# Patient Record
Sex: FEMALE | Race: ASIAN | NOT HISPANIC OR LATINO
[De-identification: names, ages, dates, MRNs, and addresses within clinical notes are randomized per-mention and may not be internally consistent; named-entity substitution may affect disease eponyms.]

---

## 2018-11-04 PROBLEM — Z00.00 ENCOUNTER FOR PREVENTIVE HEALTH EXAMINATION: Status: ACTIVE | Noted: 2018-11-04

## 2018-12-10 ENCOUNTER — APPOINTMENT (OUTPATIENT)
Dept: OBGYN | Facility: CLINIC | Age: 33
End: 2018-12-10
Payer: COMMERCIAL

## 2018-12-10 VITALS
HEIGHT: 63 IN | SYSTOLIC BLOOD PRESSURE: 120 MMHG | BODY MASS INDEX: 26.58 KG/M2 | DIASTOLIC BLOOD PRESSURE: 80 MMHG | WEIGHT: 150 LBS

## 2018-12-10 DIAGNOSIS — Z83.3 FAMILY HISTORY OF DIABETES MELLITUS: ICD-10-CM

## 2018-12-10 DIAGNOSIS — Z78.9 OTHER SPECIFIED HEALTH STATUS: ICD-10-CM

## 2018-12-10 DIAGNOSIS — Z82.49 FAMILY HISTORY OF ISCHEMIC HEART DISEASE AND OTHER DISEASES OF THE CIRCULATORY SYSTEM: ICD-10-CM

## 2018-12-10 PROCEDURE — 0500F INITIAL PRENATAL CARE VISIT: CPT

## 2018-12-10 PROCEDURE — 76805 OB US >/= 14 WKS SNGL FETUS: CPT

## 2018-12-10 RX ORDER — PRENATAL VIT 49/IRON FUM/FOLIC 6.75-0.2MG
TABLET ORAL
Refills: 0 | Status: ACTIVE | COMMUNITY

## 2018-12-12 LAB
ABO + RH PNL BLD: NORMAL
BLD GP AB SCN SERPL QL: NORMAL
C TRACH RRNA SPEC QL NAA+PROBE: NOT DETECTED
CMV IGG SERPL QL: 6.7 U/ML
CMV IGG SERPL-IMP: POSITIVE
CMV IGM SERPL QL: <8 AU/ML
CMV IGM SERPL QL: NEGATIVE
HAV IGM SER QL: NONREACTIVE
HCV AB SER QL: NONREACTIVE
HCV S/CO RATIO: 0.24 S/CO
HEPATITIS A IGG ANTIBODY: NONREACTIVE
HSV 1+2 IGG SER IA-IMP: NEGATIVE
HSV 1+2 IGG SER IA-IMP: POSITIVE
HSV1 IGG SER QL: 51.6 INDEX
HSV2 IGG SER QL: 0.08 INDEX
MEV IGG FLD QL IA: 26.8 AU/ML
MEV IGG+IGM SER-IMP: NORMAL
N GONORRHOEA RRNA SPEC QL NAA+PROBE: NOT DETECTED
RUBV IGG FLD-ACNC: 3.6 INDEX
RUBV IGG SER-IMP: POSITIVE
SOURCE AMPLIFICATION: NORMAL
T GONDII AB SER-IMP: NEGATIVE
T GONDII AB SER-IMP: NEGATIVE
T GONDII IGG SER QL: <3 IU/ML
T GONDII IGM SER QL: <3 AU/ML
TSH SERPL-ACNC: 1.46 UIU/ML
VZV AB TITR SER: POSITIVE
VZV IGG SER IF-ACNC: 415.2 INDEX

## 2018-12-13 LAB
B19V IGG SER QL IA: 1.2 INDEX
B19V IGG+IGM SER-IMP: NORMAL
B19V IGG+IGM SER-IMP: POSITIVE
B19V IGM FLD-ACNC: 0.1 INDEX
B19V IGM SER-ACNC: NEGATIVE
MEV IGM SER QL: NEGATIVE
RUBV IGM FLD-ACNC: <20 AU/ML

## 2018-12-14 LAB
HSV1 IGM SER QL: NORMAL TITER
HSV2 AB FLD-ACNC: NORMAL TITER
VZV IGM SER IF-ACNC: <0.91 INDEX

## 2018-12-24 ENCOUNTER — APPOINTMENT (OUTPATIENT)
Dept: OBGYN | Facility: CLINIC | Age: 33
End: 2018-12-24
Payer: COMMERCIAL

## 2018-12-24 ENCOUNTER — NON-APPOINTMENT (OUTPATIENT)
Age: 33
End: 2018-12-24

## 2018-12-24 ENCOUNTER — CHART COPY (OUTPATIENT)
Age: 33
End: 2018-12-24

## 2018-12-24 VITALS
SYSTOLIC BLOOD PRESSURE: 110 MMHG | WEIGHT: 149 LBS | BODY MASS INDEX: 26.4 KG/M2 | HEIGHT: 63 IN | DIASTOLIC BLOOD PRESSURE: 80 MMHG

## 2018-12-24 DIAGNOSIS — O28.9 UNSPECIFIED ABNORMAL FINDINGS ON ANTENATAL SCREENING OF MOTHER: ICD-10-CM

## 2018-12-24 PROCEDURE — 0502F SUBSEQUENT PRENATAL CARE: CPT

## 2019-01-04 LAB
CLARIM 15Q11.2: NORMAL
CLARIM 1P36: NORMAL
CLARIM 22Q11.2: NORMAL
CLARIM 4P-/WOLF-HIRSCHHORN: NORMAL
CLARIM 5P-/CRI DU CHAT: NORMAL
CLARIM ADDITIONAL INFO: NORMAL
CLARIM CHROMOSOME 13: NORMAL
CLARIM CHROMOSOME 18: NORMAL
CLARIM CHROMOSOME 21: NORMAL
CLARIM SEX CHROMOSOMES: NORMAL
CLARITEST NIPT W/MICRO: NORMAL

## 2019-01-17 ENCOUNTER — NON-APPOINTMENT (OUTPATIENT)
Age: 34
End: 2019-01-17

## 2019-01-17 ENCOUNTER — APPOINTMENT (OUTPATIENT)
Dept: OBGYN | Facility: CLINIC | Age: 34
End: 2019-01-17
Payer: COMMERCIAL

## 2019-01-17 VITALS
BODY MASS INDEX: 26.75 KG/M2 | DIASTOLIC BLOOD PRESSURE: 60 MMHG | HEIGHT: 63 IN | WEIGHT: 151 LBS | SYSTOLIC BLOOD PRESSURE: 100 MMHG

## 2019-01-17 PROCEDURE — 0502F SUBSEQUENT PRENATAL CARE: CPT

## 2019-02-19 ENCOUNTER — LABORATORY RESULT (OUTPATIENT)
Age: 34
End: 2019-02-19

## 2019-02-19 ENCOUNTER — APPOINTMENT (OUTPATIENT)
Dept: OBGYN | Facility: CLINIC | Age: 34
End: 2019-02-19
Payer: COMMERCIAL

## 2019-02-19 VITALS — DIASTOLIC BLOOD PRESSURE: 75 MMHG | HEIGHT: 63 IN | SYSTOLIC BLOOD PRESSURE: 118 MMHG

## 2019-02-19 PROCEDURE — 0502F SUBSEQUENT PRENATAL CARE: CPT

## 2019-02-21 LAB — BACTERIA UR CULT: NORMAL

## 2019-03-15 ENCOUNTER — APPOINTMENT (OUTPATIENT)
Dept: OBGYN | Facility: CLINIC | Age: 34
End: 2019-03-15
Payer: COMMERCIAL

## 2019-03-15 ENCOUNTER — LABORATORY RESULT (OUTPATIENT)
Age: 34
End: 2019-03-15

## 2019-03-15 VITALS
DIASTOLIC BLOOD PRESSURE: 80 MMHG | WEIGHT: 166 LBS | SYSTOLIC BLOOD PRESSURE: 110 MMHG | BODY MASS INDEX: 29.41 KG/M2 | HEIGHT: 63 IN

## 2019-03-15 PROCEDURE — 0502F SUBSEQUENT PRENATAL CARE: CPT

## 2019-03-17 LAB
BACTERIA UR CULT: NORMAL
BASOPHILS # BLD AUTO: 0 K/UL
BASOPHILS NFR BLD AUTO: 0 %
EOSINOPHIL # BLD AUTO: 0 K/UL
EOSINOPHIL NFR BLD AUTO: 0 %
GLUCOSE 1H P 50 G GLC PO SERPL-MCNC: 79 MG/DL
HBA1C MFR BLD HPLC: 5 %
HCT VFR BLD CALC: 40.3 %
HGB BLD-MCNC: 12.6 G/DL
LYMPHOCYTES # BLD AUTO: 2.15 K/UL
LYMPHOCYTES NFR BLD AUTO: 19.5 %
MAN DIFF?: NORMAL
MCHC RBC-ENTMCNC: 31.3 GM/DL
MCHC RBC-ENTMCNC: 33.2 PG
MCV RBC AUTO: 106.1 FL
MONOCYTES # BLD AUTO: 0.39 K/UL
MONOCYTES NFR BLD AUTO: 3.5 %
NEUTROPHILS # BLD AUTO: 8.39 K/UL
NEUTROPHILS NFR BLD AUTO: 76.1 %
PLATELET # BLD AUTO: 195 K/UL
RBC # BLD: 3.8 M/UL
RBC # FLD: 12.7 %
WBC # FLD AUTO: 11.03 K/UL

## 2019-04-03 ENCOUNTER — APPOINTMENT (OUTPATIENT)
Dept: OBGYN | Facility: CLINIC | Age: 34
End: 2019-04-03
Payer: COMMERCIAL

## 2019-04-03 VITALS
BODY MASS INDEX: 30.12 KG/M2 | DIASTOLIC BLOOD PRESSURE: 70 MMHG | WEIGHT: 170 LBS | HEIGHT: 63 IN | SYSTOLIC BLOOD PRESSURE: 110 MMHG

## 2019-04-03 PROCEDURE — 0502F SUBSEQUENT PRENATAL CARE: CPT

## 2019-04-04 LAB — HIV1+2 AB SPEC QL IA.RAPID: NONREACTIVE

## 2019-04-29 ENCOUNTER — APPOINTMENT (OUTPATIENT)
Dept: OBGYN | Facility: CLINIC | Age: 34
End: 2019-04-29
Payer: COMMERCIAL

## 2019-04-29 VITALS
WEIGHT: 177 LBS | SYSTOLIC BLOOD PRESSURE: 120 MMHG | HEIGHT: 63 IN | BODY MASS INDEX: 31.36 KG/M2 | DIASTOLIC BLOOD PRESSURE: 75 MMHG

## 2019-04-29 DIAGNOSIS — Z34.92 ENCOUNTER FOR SUPERVISION OF NORMAL PREGNANCY, UNSPECIFIED, SECOND TRIMESTER: ICD-10-CM

## 2019-04-29 PROCEDURE — 0502F SUBSEQUENT PRENATAL CARE: CPT

## 2019-05-14 ENCOUNTER — APPOINTMENT (OUTPATIENT)
Dept: ORTHOPEDIC SURGERY | Facility: CLINIC | Age: 34
End: 2019-05-14
Payer: COMMERCIAL

## 2019-05-14 ENCOUNTER — APPOINTMENT (OUTPATIENT)
Dept: OBGYN | Facility: CLINIC | Age: 34
End: 2019-05-14
Payer: COMMERCIAL

## 2019-05-14 ENCOUNTER — APPOINTMENT (OUTPATIENT)
Dept: ANTEPARTUM | Facility: CLINIC | Age: 34
End: 2019-05-14
Payer: COMMERCIAL

## 2019-05-14 VITALS
BODY MASS INDEX: 32.07 KG/M2 | WEIGHT: 181 LBS | SYSTOLIC BLOOD PRESSURE: 120 MMHG | DIASTOLIC BLOOD PRESSURE: 80 MMHG | HEIGHT: 63 IN

## 2019-05-14 VITALS — WEIGHT: 181 LBS | RESPIRATION RATE: 16 BRPM | HEIGHT: 63 IN | BODY MASS INDEX: 32.07 KG/M2

## 2019-05-14 PROCEDURE — 20526 THER INJECTION CARP TUNNEL: CPT | Mod: LT

## 2019-05-14 PROCEDURE — 76820 UMBILICAL ARTERY ECHO: CPT

## 2019-05-14 PROCEDURE — 76816 OB US FOLLOW-UP PER FETUS: CPT

## 2019-05-14 PROCEDURE — 99203 OFFICE O/P NEW LOW 30 MIN: CPT

## 2019-05-14 PROCEDURE — 0502F SUBSEQUENT PRENATAL CARE: CPT

## 2019-05-14 PROCEDURE — 76819 FETAL BIOPHYS PROFIL W/O NST: CPT

## 2019-05-21 ENCOUNTER — MOBILE ON CALL (OUTPATIENT)
Age: 34
End: 2019-05-21

## 2019-05-21 ENCOUNTER — OTHER (OUTPATIENT)
Age: 34
End: 2019-05-21

## 2019-06-04 ENCOUNTER — APPOINTMENT (OUTPATIENT)
Dept: OBGYN | Facility: CLINIC | Age: 34
End: 2019-06-04
Payer: COMMERCIAL

## 2019-06-04 ENCOUNTER — APPOINTMENT (OUTPATIENT)
Dept: ANTEPARTUM | Facility: CLINIC | Age: 34
End: 2019-06-04
Payer: COMMERCIAL

## 2019-06-04 ENCOUNTER — NON-APPOINTMENT (OUTPATIENT)
Age: 34
End: 2019-06-04

## 2019-06-04 VITALS
DIASTOLIC BLOOD PRESSURE: 80 MMHG | BODY MASS INDEX: 32.78 KG/M2 | SYSTOLIC BLOOD PRESSURE: 120 MMHG | HEIGHT: 63 IN | WEIGHT: 185 LBS

## 2019-06-04 PROCEDURE — 87653 STREP B DNA AMP PROBE: CPT

## 2019-06-04 PROCEDURE — 0502F SUBSEQUENT PRENATAL CARE: CPT

## 2019-06-04 PROCEDURE — 76816 OB US FOLLOW-UP PER FETUS: CPT

## 2019-06-04 PROCEDURE — 76819 FETAL BIOPHYS PROFIL W/O NST: CPT

## 2019-06-11 ENCOUNTER — APPOINTMENT (OUTPATIENT)
Dept: ANTEPARTUM | Facility: CLINIC | Age: 34
End: 2019-06-11
Payer: COMMERCIAL

## 2019-06-11 PROCEDURE — 76819 FETAL BIOPHYS PROFIL W/O NST: CPT

## 2019-06-11 PROCEDURE — 76820 UMBILICAL ARTERY ECHO: CPT

## 2019-06-11 PROCEDURE — 76816 OB US FOLLOW-UP PER FETUS: CPT

## 2019-06-12 ENCOUNTER — APPOINTMENT (OUTPATIENT)
Dept: OBGYN | Facility: CLINIC | Age: 34
End: 2019-06-12
Payer: COMMERCIAL

## 2019-06-12 VITALS
BODY MASS INDEX: 33.13 KG/M2 | DIASTOLIC BLOOD PRESSURE: 80 MMHG | WEIGHT: 187 LBS | HEIGHT: 63 IN | SYSTOLIC BLOOD PRESSURE: 118 MMHG

## 2019-06-12 DIAGNOSIS — Z34.93 ENCOUNTER FOR SUPERVISION OF NORMAL PREGNANCY, UNSPECIFIED, THIRD TRIMESTER: ICD-10-CM

## 2019-06-12 PROCEDURE — 0502F SUBSEQUENT PRENATAL CARE: CPT

## 2019-06-14 ENCOUNTER — INPATIENT (INPATIENT)
Facility: HOSPITAL | Age: 34
LOS: 4 days | Discharge: HOME | End: 2019-06-19
Attending: OBSTETRICS & GYNECOLOGY | Admitting: OBSTETRICS & GYNECOLOGY
Payer: COMMERCIAL

## 2019-06-14 VITALS — WEIGHT: 185.41 LBS

## 2019-06-14 DIAGNOSIS — O26.899 OTHER SPECIFIED PREGNANCY RELATED CONDITIONS, UNSPECIFIED TRIMESTER: ICD-10-CM

## 2019-06-14 DIAGNOSIS — Z3A.00 WEEKS OF GESTATION OF PREGNANCY NOT SPECIFIED: ICD-10-CM

## 2019-06-14 LAB
ALBUMIN SERPL ELPH-MCNC: 3.7 G/DL — SIGNIFICANT CHANGE UP (ref 3.3–5)
ALP SERPL-CCNC: 158 U/L — HIGH (ref 40–120)
ALT FLD-CCNC: 26 U/L — SIGNIFICANT CHANGE UP (ref 10–45)
ANION GAP SERPL CALC-SCNC: 12 MMOL/L — SIGNIFICANT CHANGE UP (ref 5–17)
AST SERPL-CCNC: 38 U/L — SIGNIFICANT CHANGE UP (ref 10–40)
BASOPHILS # BLD AUTO: 0.04 K/UL — SIGNIFICANT CHANGE UP (ref 0–0.2)
BASOPHILS NFR BLD AUTO: 0.4 % — SIGNIFICANT CHANGE UP (ref 0–2)
BILIRUB SERPL-MCNC: 0.2 MG/DL — SIGNIFICANT CHANGE UP (ref 0.2–1.2)
BUN SERPL-MCNC: 10 MG/DL — SIGNIFICANT CHANGE UP (ref 7–23)
CALCIUM SERPL-MCNC: 9.8 MG/DL — SIGNIFICANT CHANGE UP (ref 8.4–10.5)
CHLORIDE SERPL-SCNC: 105 MMOL/L — SIGNIFICANT CHANGE UP (ref 96–108)
CO2 SERPL-SCNC: 23 MMOL/L — SIGNIFICANT CHANGE UP (ref 22–31)
CREAT SERPL-MCNC: 0.87 MG/DL — SIGNIFICANT CHANGE UP (ref 0.5–1.3)
EOSINOPHIL # BLD AUTO: 0.1 K/UL — SIGNIFICANT CHANGE UP (ref 0–0.5)
EOSINOPHIL NFR BLD AUTO: 0.9 % — SIGNIFICANT CHANGE UP (ref 0–6)
GLUCOSE SERPL-MCNC: 96 MG/DL — SIGNIFICANT CHANGE UP (ref 70–99)
HCT VFR BLD CALC: 41.9 % — SIGNIFICANT CHANGE UP (ref 34.5–45)
HGB BLD-MCNC: 14.7 G/DL — SIGNIFICANT CHANGE UP (ref 11.5–15.5)
IMM GRANULOCYTES NFR BLD AUTO: 0.7 % — SIGNIFICANT CHANGE UP (ref 0–1.5)
LDH SERPL L TO P-CCNC: 343 U/L — HIGH (ref 50–242)
LYMPHOCYTES # BLD AUTO: 2.67 K/UL — SIGNIFICANT CHANGE UP (ref 1–3.3)
LYMPHOCYTES # BLD AUTO: 24.5 % — SIGNIFICANT CHANGE UP (ref 13–44)
MCHC RBC-ENTMCNC: 34 PG — SIGNIFICANT CHANGE UP (ref 27–34)
MCHC RBC-ENTMCNC: 35.1 GM/DL — SIGNIFICANT CHANGE UP (ref 32–36)
MCV RBC AUTO: 97 FL — SIGNIFICANT CHANGE UP (ref 80–100)
MONOCYTES # BLD AUTO: 0.93 K/UL — HIGH (ref 0–0.9)
MONOCYTES NFR BLD AUTO: 8.5 % — SIGNIFICANT CHANGE UP (ref 2–14)
NEUTROPHILS # BLD AUTO: 7.07 K/UL — SIGNIFICANT CHANGE UP (ref 1.8–7.4)
NEUTROPHILS NFR BLD AUTO: 65 % — SIGNIFICANT CHANGE UP (ref 43–77)
NRBC # BLD: 0 /100 WBCS — SIGNIFICANT CHANGE UP (ref 0–0)
PLATELET # BLD AUTO: 177 K/UL — SIGNIFICANT CHANGE UP (ref 150–400)
POTASSIUM SERPL-MCNC: 4.8 MMOL/L — SIGNIFICANT CHANGE UP (ref 3.5–5.3)
POTASSIUM SERPL-SCNC: 4.8 MMOL/L — SIGNIFICANT CHANGE UP (ref 3.5–5.3)
PROT SERPL-MCNC: 7.1 G/DL — SIGNIFICANT CHANGE UP (ref 6–8.3)
RBC # BLD: 4.32 M/UL — SIGNIFICANT CHANGE UP (ref 3.8–5.2)
RBC # FLD: 12 % — SIGNIFICANT CHANGE UP (ref 10.3–14.5)
SODIUM SERPL-SCNC: 140 MMOL/L — SIGNIFICANT CHANGE UP (ref 135–145)
URATE SERPL-MCNC: 6.2 MG/DL — SIGNIFICANT CHANGE UP (ref 2.5–7)
WBC # BLD: 10.89 K/UL — HIGH (ref 3.8–10.5)
WBC # FLD AUTO: 10.89 K/UL — HIGH (ref 3.8–10.5)

## 2019-06-14 RX ORDER — PENICILLIN G POTASSIUM 5000000 [IU]/1
5 POWDER, FOR SOLUTION INTRAMUSCULAR; INTRAPLEURAL; INTRATHECAL; INTRAVENOUS ONCE
Refills: 0 | Status: COMPLETED | OUTPATIENT
Start: 2019-06-14 | End: 2019-06-14

## 2019-06-14 RX ORDER — MAGNESIUM SULFATE 500 MG/ML
4 VIAL (ML) INJECTION ONCE
Refills: 0 | Status: COMPLETED | OUTPATIENT
Start: 2019-06-14 | End: 2019-06-14

## 2019-06-14 RX ORDER — SODIUM CHLORIDE 9 MG/ML
1000 INJECTION, SOLUTION INTRAVENOUS
Refills: 0 | Status: DISCONTINUED | OUTPATIENT
Start: 2019-06-14 | End: 2019-06-14

## 2019-06-14 RX ORDER — DINOPROSTONE 10 MG/241MG
10 INSERT VAGINAL ONCE
Refills: 0 | Status: COMPLETED | OUTPATIENT
Start: 2019-06-14 | End: 2019-06-15

## 2019-06-14 RX ORDER — MAGNESIUM SULFATE 500 MG/ML
2 VIAL (ML) INJECTION
Qty: 40 | Refills: 0 | Status: DISCONTINUED | OUTPATIENT
Start: 2019-06-14 | End: 2019-06-16

## 2019-06-14 RX ORDER — MAGNESIUM SULFATE 500 MG/ML
2 VIAL (ML) INJECTION ONCE
Qty: 40 | Refills: 0 | Status: COMPLETED | OUTPATIENT
Start: 2019-06-14 | End: 2019-06-14

## 2019-06-14 RX ORDER — PENICILLIN G POTASSIUM 5000000 [IU]/1
POWDER, FOR SOLUTION INTRAMUSCULAR; INTRAPLEURAL; INTRATHECAL; INTRAVENOUS
Refills: 0 | Status: DISCONTINUED | OUTPATIENT
Start: 2019-06-14 | End: 2019-06-16

## 2019-06-14 RX ORDER — OXYTOCIN 10 UNIT/ML
333.33 VIAL (ML) INJECTION
Qty: 20 | Refills: 0 | Status: DISCONTINUED | OUTPATIENT
Start: 2019-06-14 | End: 2019-06-19

## 2019-06-14 RX ORDER — PENICILLIN G POTASSIUM 5000000 [IU]/1
2.5 POWDER, FOR SOLUTION INTRAMUSCULAR; INTRAPLEURAL; INTRATHECAL; INTRAVENOUS EVERY 4 HOURS
Refills: 0 | Status: DISCONTINUED | OUTPATIENT
Start: 2019-06-15 | End: 2019-06-16

## 2019-06-14 RX ORDER — SODIUM CHLORIDE 9 MG/ML
1000 INJECTION, SOLUTION INTRAVENOUS
Refills: 0 | Status: DISCONTINUED | OUTPATIENT
Start: 2019-06-14 | End: 2019-06-16

## 2019-06-14 RX ORDER — CITRIC ACID/SODIUM CITRATE 300-500 MG
15 SOLUTION, ORAL ORAL ONCE
Refills: 0 | Status: COMPLETED | OUTPATIENT
Start: 2019-06-14 | End: 2019-06-16

## 2019-06-14 RX ADMIN — PENICILLIN G POTASSIUM 100 MILLION UNIT(S): 5000000 POWDER, FOR SOLUTION INTRAMUSCULAR; INTRAPLEURAL; INTRATHECAL; INTRAVENOUS at 23:26

## 2019-06-14 RX ADMIN — Medication 100 GRAM(S): at 22:55

## 2019-06-14 RX ADMIN — Medication 50 GM/HR: at 23:43

## 2019-06-14 RX ADMIN — SODIUM CHLORIDE 75 MILLILITER(S): 9 INJECTION, SOLUTION INTRAVENOUS at 23:44

## 2019-06-15 LAB
BLD GP AB SCN SERPL QL: NEGATIVE — SIGNIFICANT CHANGE UP
CREAT ?TM UR-MCNC: 49 MG/DL — SIGNIFICANT CHANGE UP
MAGNESIUM SERPL-MCNC: 5 MG/DL — HIGH (ref 1.6–2.6)
MAGNESIUM SERPL-MCNC: 5.6 MG/DL — HIGH (ref 1.6–2.6)
MAGNESIUM SERPL-MCNC: 5.8 MG/DL — HIGH (ref 1.6–2.6)
PROT ?TM UR-MCNC: 10 MG/DL — SIGNIFICANT CHANGE UP (ref 0–12)
PROT/CREAT UR-RTO: 0.2 RATIO — SIGNIFICANT CHANGE UP (ref 0–0.2)
RH IG SCN BLD-IMP: POSITIVE — SIGNIFICANT CHANGE UP
RH IG SCN BLD-IMP: POSITIVE — SIGNIFICANT CHANGE UP

## 2019-06-15 RX ORDER — OXYTOCIN 10 UNIT/ML
1 VIAL (ML) INJECTION
Qty: 30 | Refills: 0 | Status: DISCONTINUED | OUTPATIENT
Start: 2019-06-15 | End: 2019-06-16

## 2019-06-15 RX ADMIN — PENICILLIN G POTASSIUM 100 MILLION UNIT(S): 5000000 POWDER, FOR SOLUTION INTRAMUSCULAR; INTRAPLEURAL; INTRATHECAL; INTRAVENOUS at 11:30

## 2019-06-15 RX ADMIN — PENICILLIN G POTASSIUM 100 MILLION UNIT(S): 5000000 POWDER, FOR SOLUTION INTRAMUSCULAR; INTRAPLEURAL; INTRATHECAL; INTRAVENOUS at 07:04

## 2019-06-15 RX ADMIN — PENICILLIN G POTASSIUM 100 MILLION UNIT(S): 5000000 POWDER, FOR SOLUTION INTRAMUSCULAR; INTRAPLEURAL; INTRATHECAL; INTRAVENOUS at 23:26

## 2019-06-15 RX ADMIN — DINOPROSTONE 10 MILLIGRAM(S): 10 INSERT VAGINAL at 00:12

## 2019-06-15 RX ADMIN — PENICILLIN G POTASSIUM 100 MILLION UNIT(S): 5000000 POWDER, FOR SOLUTION INTRAMUSCULAR; INTRAPLEURAL; INTRATHECAL; INTRAVENOUS at 15:17

## 2019-06-15 RX ADMIN — PENICILLIN G POTASSIUM 100 MILLION UNIT(S): 5000000 POWDER, FOR SOLUTION INTRAMUSCULAR; INTRAPLEURAL; INTRATHECAL; INTRAVENOUS at 19:30

## 2019-06-15 RX ADMIN — PENICILLIN G POTASSIUM 100 MILLION UNIT(S): 5000000 POWDER, FOR SOLUTION INTRAMUSCULAR; INTRAPLEURAL; INTRATHECAL; INTRAVENOUS at 03:39

## 2019-06-15 RX ADMIN — Medication 1 MILLIUNIT(S)/MIN: at 22:15

## 2019-06-16 ENCOUNTER — RESULT REVIEW (OUTPATIENT)
Age: 34
End: 2019-06-16

## 2019-06-16 LAB
ALBUMIN SERPL ELPH-MCNC: 3.9 G/DL — SIGNIFICANT CHANGE UP (ref 3.3–5)
ALP SERPL-CCNC: 183 U/L — HIGH (ref 40–120)
ALT FLD-CCNC: 28 U/L — SIGNIFICANT CHANGE UP (ref 10–45)
ANION GAP SERPL CALC-SCNC: 17 MMOL/L — SIGNIFICANT CHANGE UP (ref 5–17)
APTT BLD: 28.7 SEC — SIGNIFICANT CHANGE UP (ref 27.5–36.3)
AST SERPL-CCNC: 29 U/L — SIGNIFICANT CHANGE UP (ref 10–40)
BASOPHILS # BLD AUTO: 0.04 K/UL — SIGNIFICANT CHANGE UP (ref 0–0.2)
BASOPHILS NFR BLD AUTO: 0.3 % — SIGNIFICANT CHANGE UP (ref 0–2)
BILIRUB SERPL-MCNC: 0.4 MG/DL — SIGNIFICANT CHANGE UP (ref 0.2–1.2)
BUN SERPL-MCNC: 7 MG/DL — SIGNIFICANT CHANGE UP (ref 7–23)
CALCIUM SERPL-MCNC: 8.1 MG/DL — LOW (ref 8.4–10.5)
CHLORIDE SERPL-SCNC: 97 MMOL/L — SIGNIFICANT CHANGE UP (ref 96–108)
CO2 SERPL-SCNC: 20 MMOL/L — LOW (ref 22–31)
CREAT SERPL-MCNC: 0.83 MG/DL — SIGNIFICANT CHANGE UP (ref 0.5–1.3)
EOSINOPHIL # BLD AUTO: 0.06 K/UL — SIGNIFICANT CHANGE UP (ref 0–0.5)
EOSINOPHIL NFR BLD AUTO: 0.4 % — SIGNIFICANT CHANGE UP (ref 0–6)
FIBRINOGEN PPP-MCNC: 408 MG/DL — SIGNIFICANT CHANGE UP (ref 258–438)
GLUCOSE SERPL-MCNC: 115 MG/DL — HIGH (ref 70–99)
HCT VFR BLD CALC: 44.6 % — SIGNIFICANT CHANGE UP (ref 34.5–45)
HGB BLD-MCNC: 15.8 G/DL — HIGH (ref 11.5–15.5)
IMM GRANULOCYTES NFR BLD AUTO: 0.5 % — SIGNIFICANT CHANGE UP (ref 0–1.5)
INR BLD: 0.89 — SIGNIFICANT CHANGE UP (ref 0.88–1.16)
LDH SERPL L TO P-CCNC: 266 U/L — HIGH (ref 50–242)
LYMPHOCYTES # BLD AUTO: 17.1 % — SIGNIFICANT CHANGE UP (ref 13–44)
LYMPHOCYTES # BLD AUTO: 2.54 K/UL — SIGNIFICANT CHANGE UP (ref 1–3.3)
MAGNESIUM SERPL-MCNC: 5.4 MG/DL — HIGH (ref 1.6–2.6)
MAGNESIUM SERPL-MCNC: 5.4 MG/DL — HIGH (ref 1.6–2.6)
MAGNESIUM SERPL-MCNC: 6.1 MG/DL — HIGH (ref 1.6–2.6)
MAGNESIUM SERPL-MCNC: 6.3 MG/DL — HIGH (ref 1.6–2.6)
MCHC RBC-ENTMCNC: 33.7 PG — SIGNIFICANT CHANGE UP (ref 27–34)
MCHC RBC-ENTMCNC: 35.4 GM/DL — SIGNIFICANT CHANGE UP (ref 32–36)
MCV RBC AUTO: 95.1 FL — SIGNIFICANT CHANGE UP (ref 80–100)
MONOCYTES # BLD AUTO: 0.97 K/UL — HIGH (ref 0–0.9)
MONOCYTES NFR BLD AUTO: 6.5 % — SIGNIFICANT CHANGE UP (ref 2–14)
NEUTROPHILS # BLD AUTO: 11.19 K/UL — HIGH (ref 1.8–7.4)
NEUTROPHILS NFR BLD AUTO: 75.2 % — SIGNIFICANT CHANGE UP (ref 43–77)
NRBC # BLD: 0 /100 WBCS — SIGNIFICANT CHANGE UP (ref 0–0)
PLATELET # BLD AUTO: 212 K/UL — SIGNIFICANT CHANGE UP (ref 150–400)
POTASSIUM SERPL-MCNC: 4 MMOL/L — SIGNIFICANT CHANGE UP (ref 3.5–5.3)
POTASSIUM SERPL-SCNC: 4 MMOL/L — SIGNIFICANT CHANGE UP (ref 3.5–5.3)
PROT SERPL-MCNC: 7.5 G/DL — SIGNIFICANT CHANGE UP (ref 6–8.3)
PROTHROM AB SERPL-ACNC: 10 SEC — SIGNIFICANT CHANGE UP (ref 10–12.9)
RBC # BLD: 4.69 M/UL — SIGNIFICANT CHANGE UP (ref 3.8–5.2)
RBC # FLD: 12.1 % — SIGNIFICANT CHANGE UP (ref 10.3–14.5)
SODIUM SERPL-SCNC: 134 MMOL/L — LOW (ref 135–145)
T PALLIDUM AB TITR SER: NEGATIVE — SIGNIFICANT CHANGE UP
URATE SERPL-MCNC: 6.2 MG/DL — SIGNIFICANT CHANGE UP (ref 2.5–7)
WBC # BLD: 14.87 K/UL — HIGH (ref 3.8–10.5)
WBC # FLD AUTO: 14.87 K/UL — HIGH (ref 3.8–10.5)

## 2019-06-16 PROCEDURE — 59510 CESAREAN DELIVERY: CPT

## 2019-06-16 RX ORDER — OXYCODONE HYDROCHLORIDE 5 MG/1
5 TABLET ORAL
Refills: 0 | Status: COMPLETED | OUTPATIENT
Start: 2019-06-16 | End: 2019-06-23

## 2019-06-16 RX ORDER — FENTANYL/BUPIVACAINE/NS/PF 2MCG/ML-.1
250 PLASTIC BAG, INJECTION (ML) INJECTION
Refills: 0 | Status: DISCONTINUED | OUTPATIENT
Start: 2019-06-16 | End: 2019-06-16

## 2019-06-16 RX ORDER — MAGNESIUM SULFATE 500 MG/ML
2 VIAL (ML) INJECTION
Qty: 40 | Refills: 0 | Status: DISCONTINUED | OUTPATIENT
Start: 2019-06-16 | End: 2019-06-17

## 2019-06-16 RX ORDER — OXYCODONE HYDROCHLORIDE 5 MG/1
5 TABLET ORAL ONCE
Refills: 0 | Status: DISCONTINUED | OUTPATIENT
Start: 2019-06-16 | End: 2019-06-19

## 2019-06-16 RX ORDER — CITRIC ACID/SODIUM CITRATE 300-500 MG
30 SOLUTION, ORAL ORAL ONCE
Refills: 0 | Status: COMPLETED | OUTPATIENT
Start: 2019-06-16 | End: 2019-06-16

## 2019-06-16 RX ORDER — GLYCERIN ADULT
1 SUPPOSITORY, RECTAL RECTAL AT BEDTIME
Refills: 0 | Status: DISCONTINUED | OUTPATIENT
Start: 2019-06-16 | End: 2019-06-19

## 2019-06-16 RX ORDER — DOCUSATE SODIUM 100 MG
100 CAPSULE ORAL
Refills: 0 | Status: DISCONTINUED | OUTPATIENT
Start: 2019-06-16 | End: 2019-06-19

## 2019-06-16 RX ORDER — LANOLIN
1 OINTMENT (GRAM) TOPICAL EVERY 6 HOURS
Refills: 0 | Status: DISCONTINUED | OUTPATIENT
Start: 2019-06-16 | End: 2019-06-19

## 2019-06-16 RX ORDER — SODIUM CHLORIDE 9 MG/ML
1000 INJECTION, SOLUTION INTRAVENOUS
Refills: 0 | Status: DISCONTINUED | OUTPATIENT
Start: 2019-06-16 | End: 2019-06-19

## 2019-06-16 RX ORDER — MAGNESIUM HYDROXIDE 400 MG/1
30 TABLET, CHEWABLE ORAL
Refills: 0 | Status: DISCONTINUED | OUTPATIENT
Start: 2019-06-16 | End: 2019-06-19

## 2019-06-16 RX ORDER — DIPHENHYDRAMINE HCL 50 MG
25 CAPSULE ORAL EVERY 6 HOURS
Refills: 0 | Status: DISCONTINUED | OUTPATIENT
Start: 2019-06-16 | End: 2019-06-19

## 2019-06-16 RX ORDER — CEFAZOLIN SODIUM 1 G
2000 VIAL (EA) INJECTION ONCE
Refills: 0 | Status: COMPLETED | OUTPATIENT
Start: 2019-06-16 | End: 2019-06-16

## 2019-06-16 RX ORDER — NALOXONE HYDROCHLORIDE 4 MG/.1ML
0.1 SPRAY NASAL
Refills: 0 | Status: DISCONTINUED | OUTPATIENT
Start: 2019-06-16 | End: 2019-06-16

## 2019-06-16 RX ORDER — DEXAMETHASONE 0.5 MG/5ML
4 ELIXIR ORAL EVERY 6 HOURS
Refills: 0 | Status: DISCONTINUED | OUTPATIENT
Start: 2019-06-16 | End: 2019-06-16

## 2019-06-16 RX ORDER — ACETAMINOPHEN 500 MG
975 TABLET ORAL EVERY 6 HOURS
Refills: 0 | Status: DISCONTINUED | OUTPATIENT
Start: 2019-06-16 | End: 2019-06-19

## 2019-06-16 RX ORDER — HEPARIN SODIUM 5000 [USP'U]/ML
5000 INJECTION INTRAVENOUS; SUBCUTANEOUS EVERY 12 HOURS
Refills: 0 | Status: DISCONTINUED | OUTPATIENT
Start: 2019-06-16 | End: 2019-06-19

## 2019-06-16 RX ORDER — AZITHROMYCIN 500 MG/1
500 TABLET, FILM COATED ORAL ONCE
Refills: 0 | Status: COMPLETED | OUTPATIENT
Start: 2019-06-16 | End: 2019-06-16

## 2019-06-16 RX ORDER — TETANUS TOXOID, REDUCED DIPHTHERIA TOXOID AND ACELLULAR PERTUSSIS VACCINE, ADSORBED 5; 2.5; 8; 8; 2.5 [IU]/.5ML; [IU]/.5ML; UG/.5ML; UG/.5ML; UG/.5ML
0.5 SUSPENSION INTRAMUSCULAR ONCE
Refills: 0 | Status: DISCONTINUED | OUTPATIENT
Start: 2019-06-16 | End: 2019-06-19

## 2019-06-16 RX ORDER — CHLORHEXIDINE GLUCONATE 213 G/1000ML
1 SOLUTION TOPICAL ONCE
Refills: 0 | Status: DISCONTINUED | OUTPATIENT
Start: 2019-06-16 | End: 2019-06-16

## 2019-06-16 RX ORDER — SIMETHICONE 80 MG/1
80 TABLET, CHEWABLE ORAL EVERY 4 HOURS
Refills: 0 | Status: DISCONTINUED | OUTPATIENT
Start: 2019-06-16 | End: 2019-06-19

## 2019-06-16 RX ORDER — OXYTOCIN 10 UNIT/ML
333.33 VIAL (ML) INJECTION
Qty: 20 | Refills: 0 | Status: DISCONTINUED | OUTPATIENT
Start: 2019-06-16 | End: 2019-06-19

## 2019-06-16 RX ORDER — ONDANSETRON 8 MG/1
4 TABLET, FILM COATED ORAL EVERY 6 HOURS
Refills: 0 | Status: DISCONTINUED | OUTPATIENT
Start: 2019-06-16 | End: 2019-06-16

## 2019-06-16 RX ADMIN — AZITHROMYCIN 255 MILLIGRAM(S): 500 TABLET, FILM COATED ORAL at 10:05

## 2019-06-16 RX ADMIN — PENICILLIN G POTASSIUM 100 MILLION UNIT(S): 5000000 POWDER, FOR SOLUTION INTRAMUSCULAR; INTRAPLEURAL; INTRATHECAL; INTRAVENOUS at 07:30

## 2019-06-16 RX ADMIN — Medication 975 MILLIGRAM(S): at 21:26

## 2019-06-16 RX ADMIN — Medication 975 MILLIGRAM(S): at 22:00

## 2019-06-16 RX ADMIN — Medication 100 MILLIGRAM(S): at 10:04

## 2019-06-16 RX ADMIN — Medication 250 MILLILITER(S): at 05:31

## 2019-06-16 RX ADMIN — Medication 30 MILLILITER(S): at 10:03

## 2019-06-16 RX ADMIN — Medication 15 MILLILITER(S): at 10:03

## 2019-06-16 RX ADMIN — Medication 50 GM/HR: at 11:52

## 2019-06-16 RX ADMIN — PENICILLIN G POTASSIUM 100 MILLION UNIT(S): 5000000 POWDER, FOR SOLUTION INTRAMUSCULAR; INTRAPLEURAL; INTRATHECAL; INTRAVENOUS at 03:15

## 2019-06-16 NOTE — PROGRESS NOTE ADULT - SUBJECTIVE AND OBJECTIVE BOX
Provider to bedside with nursing for evaluation of heavy lochia as informed by nursing. Upon evaluation, patient reports that she feels well. She reports that her pain is controlled. On abdominal exam, no bleeding noted with pressure applied to fundus. Abdomen nontender. Will continue to monitor for bleeding. Nurse agrees to call provider if bleeding worsens.

## 2019-06-16 NOTE — PROGRESS NOTE ADULT - ASSESSMENT
A&P: 33y Female   s/p  primary c/s for arrest of labor @ 6cm           complicated by preeclampsia with severe features.      1. Preeclampsia:   Continue IV Magnesium @2G/hr for 24 hrs post delivery.   Magnesium clinical checks and serum assay q6 hrs.  Next Mg check @ 00:00  No complaints currently.   BP controlled  2. GI: Diet: as tolerated   3. Neuro: PO pain medications PRN, hold NSAIDs  4. : strict Is and Os, davenport in place

## 2019-06-16 NOTE — PROGRESS NOTE ADULT - SUBJECTIVE AND OBJECTIVE BOX
Patient evaluated at bedside for clinical magnesium check.     She denies visual disturbances including scotoma, headache and right upper quadrant pain. Also denies nausea/vomiting/epigastric pain/shortness of breath. Pain well controlled.      T(C): 36.8 (06-16-19 @ 18:10), Max: 36.8 (06-16-19 @ 16:00)  HR: 73 (06-16-19 @ 18:10) (73 - 104)  BP: 118/77 (06-16-19 @ 18:10) (99/67 - 123/83)  RR: 18 (06-16-19 @ 18:10) (16 - 23)  SpO2: 96% (06-16-19 @ 18:10) (96% - 100%)  Wt(kg): --    Gen: NAD  Pulm: CTAB  Abd: soft, nontender, no rebound or guarding, no epigastric tenderness, liver nonpalpable +BS, fundus palpated, provenia dressing in place   : Ragland in place  Ext: Reflexes +                          15.8   14.87 )-----------( 212      ( 16 Jun 2019 04:35 )             44.6     06-16    134<L>  |  97  |  7   ----------------------------<  115<H>  4.0   |  20<L>  |  0.83    Ca    8.1<L>      16 Jun 2019 04:35  Mg     5.4     06-16    TPro  7.5  /  Alb  3.9  /  TBili  0.4  /  DBili  x   /  AST  29  /  ALT  28  /  AlkPhos  183<H>  06-16        06-15-19 @ 07:01  -  06-16-19 @ 07:00  --------------------------------------------------------  IN: 200 mL / OUT: 350 mL / NET: -150 mL    06-16-19 @ 07:01  -  06-16-19 @ 18:26  --------------------------------------------------------  IN: 7023 mL / OUT: 3325 mL / NET: 3698 mL      MEDICATIONS  (STANDING):  acetaminophen   Tablet .. 975 milliGRAM(s) Oral every 6 hours  diphtheria/tetanus/pertussis (acellular) Vaccine (ADAcel) 0.5 milliLiter(s) IntraMuscular once  heparin  Injectable 5000 Unit(s) SubCutaneous every 12 hours  lactated ringers. 1000 milliLiter(s) (125 mL/Hr) IV Continuous <Continuous>  magnesium sulfate Infusion 2 Gm/Hr (50 mL/Hr) IV Continuous <Continuous>  oxytocin Infusion 333.333 milliUNIT(s)/Min (1000 mL/Hr) IV Continuous <Continuous>  oxytocin Infusion 333.333 milliUNIT(s)/Min (1000 mL/Hr) IV Continuous <Continuous>    MEDICATIONS  (PRN):  diphenhydrAMINE 25 milliGRAM(s) Oral every 6 hours PRN Itching  docusate sodium 100 milliGRAM(s) Oral two times a day PRN Stool softening  glycerin Suppository - Adult 1 Suppository(s) Rectal at bedtime PRN Constipation  lanolin Ointment 1 Application(s) Topical every 6 hours PRN Sore Nipples  magnesium hydroxide Suspension 30 milliLiter(s) Oral two times a day PRN Constipation  oxyCODONE    IR 5 milliGRAM(s) Oral every 3 hours PRN Moderate Pain (4 - 6)  oxyCODONE    IR 5 milliGRAM(s) Oral once PRN Severe Pain (7 - 10)  simethicone 80 milliGRAM(s) Chew every 4 hours PRN Gas

## 2019-06-17 LAB
BASOPHILS # BLD AUTO: 0.03 K/UL — SIGNIFICANT CHANGE UP (ref 0–0.2)
BASOPHILS NFR BLD AUTO: 0.1 % — SIGNIFICANT CHANGE UP (ref 0–2)
EOSINOPHIL # BLD AUTO: 0.02 K/UL — SIGNIFICANT CHANGE UP (ref 0–0.5)
EOSINOPHIL NFR BLD AUTO: 0.1 % — SIGNIFICANT CHANGE UP (ref 0–6)
HCT VFR BLD CALC: 32.8 % — LOW (ref 34.5–45)
HGB BLD-MCNC: 11.2 G/DL — LOW (ref 11.5–15.5)
IMM GRANULOCYTES NFR BLD AUTO: 0.5 % — SIGNIFICANT CHANGE UP (ref 0–1.5)
LYMPHOCYTES # BLD AUTO: 13.9 % — SIGNIFICANT CHANGE UP (ref 13–44)
LYMPHOCYTES # BLD AUTO: 3.09 K/UL — SIGNIFICANT CHANGE UP (ref 1–3.3)
MAGNESIUM SERPL-MCNC: 6.2 MG/DL — HIGH (ref 1.6–2.6)
MAGNESIUM SERPL-MCNC: 6.2 MG/DL — HIGH (ref 1.6–2.6)
MAGNESIUM SERPL-MCNC: 6.4 MG/DL — HIGH (ref 1.6–2.6)
MCHC RBC-ENTMCNC: 33.5 PG — SIGNIFICANT CHANGE UP (ref 27–34)
MCHC RBC-ENTMCNC: 34.1 GM/DL — SIGNIFICANT CHANGE UP (ref 32–36)
MCV RBC AUTO: 98.2 FL — SIGNIFICANT CHANGE UP (ref 80–100)
MONOCYTES # BLD AUTO: 1.49 K/UL — HIGH (ref 0–0.9)
MONOCYTES NFR BLD AUTO: 6.7 % — SIGNIFICANT CHANGE UP (ref 2–14)
NEUTROPHILS # BLD AUTO: 17.47 K/UL — HIGH (ref 1.8–7.4)
NEUTROPHILS NFR BLD AUTO: 78.7 % — HIGH (ref 43–77)
NRBC # BLD: 0 /100 WBCS — SIGNIFICANT CHANGE UP (ref 0–0)
PLATELET # BLD AUTO: 167 K/UL — SIGNIFICANT CHANGE UP (ref 150–400)
RBC # BLD: 3.34 M/UL — LOW (ref 3.8–5.2)
RBC # FLD: 12.1 % — SIGNIFICANT CHANGE UP (ref 10.3–14.5)
WBC # BLD: 22.21 K/UL — HIGH (ref 3.8–10.5)
WBC # FLD AUTO: 22.21 K/UL — HIGH (ref 3.8–10.5)

## 2019-06-17 RX ORDER — OXYCODONE HYDROCHLORIDE 5 MG/1
5 TABLET ORAL
Refills: 0 | Status: DISCONTINUED | OUTPATIENT
Start: 2019-06-17 | End: 2019-06-19

## 2019-06-17 RX ADMIN — Medication 975 MILLIGRAM(S): at 09:49

## 2019-06-17 RX ADMIN — Medication 975 MILLIGRAM(S): at 17:19

## 2019-06-17 RX ADMIN — Medication 100 MILLIGRAM(S): at 00:27

## 2019-06-17 RX ADMIN — HEPARIN SODIUM 5000 UNIT(S): 5000 INJECTION INTRAVENOUS; SUBCUTANEOUS at 12:57

## 2019-06-17 RX ADMIN — Medication 975 MILLIGRAM(S): at 03:53

## 2019-06-17 RX ADMIN — Medication 975 MILLIGRAM(S): at 20:56

## 2019-06-17 RX ADMIN — HEPARIN SODIUM 5000 UNIT(S): 5000 INJECTION INTRAVENOUS; SUBCUTANEOUS at 00:27

## 2019-06-17 RX ADMIN — Medication 975 MILLIGRAM(S): at 21:30

## 2019-06-17 RX ADMIN — Medication 975 MILLIGRAM(S): at 10:40

## 2019-06-17 RX ADMIN — Medication 100 MILLIGRAM(S): at 09:49

## 2019-06-17 RX ADMIN — Medication 975 MILLIGRAM(S): at 04:25

## 2019-06-17 RX ADMIN — Medication 975 MILLIGRAM(S): at 18:00

## 2019-06-17 RX ADMIN — HEPARIN SODIUM 5000 UNIT(S): 5000 INJECTION INTRAVENOUS; SUBCUTANEOUS at 20:58

## 2019-06-17 RX ADMIN — Medication 50 GM/HR: at 01:20

## 2019-06-17 NOTE — PROGRESS NOTE ADULT - ASSESSMENT
A&P: 33y Female POD#1 s/p  primary C/S complicated by preeclampsia with severe features.      1. Preeclampsia:   Continue IV Magnesium @2G/hr for 24 hrs post delivery.   Magnesium clinical checks and serum assay q6 hrs.  Next Mg check @ 06:00  No complaints currently.   BP controlled  2. GI: Diet: Clears as tolerated  3. Neuro: PO pain medications PRN, hold NSAIDs  4. : strict Is and Os, davenport in place

## 2019-06-17 NOTE — PROGRESS NOTE ADULT - ASSESSMENT
33y Female POD# 1   s/p C/S, complicated by PEC with SF                                   PEC w/ SF: patient currently on magnesium, no toxic complaints and has been therapeutic. Will plan to d/c mag @ 10:50 this morning. Normotensive overnight  1. Neuro/Pain:  OPM  2  CV:  VS per routine  3. Pulm: Encourage ISS & Ambulation  4. GI:  Reg  5. : TOV after magnesium   6. DVT ppx: SCDs, SQH 5000 mg BID  7. Dispo: POD #3 or #4

## 2019-06-17 NOTE — PROGRESS NOTE ADULT - SUBJECTIVE AND OBJECTIVE BOX
Patient evaluated at bedside.   She reports pain is well controlled.  Ragland in place  No Flatus  Not OOB yet.    She denies HA, dizziness, CP, palpitations, SOB, n/v, or heavy vaginal bleeding.    Physical Exam:  Vital Signs Last 24 Hrs  T(C): 36.8 (17 Jun 2019 05:30), Max: 36.8 (16 Jun 2019 16:00)  T(F): 98.2 (17 Jun 2019 05:30), Max: 98.3 (16 Jun 2019 18:10)  HR: 72 (17 Jun 2019 05:30) (69 - 104)  BP: 107/72 (17 Jun 2019 05:30) (99/67 - 126/86)  BP(mean): --  RR: 19 (17 Jun 2019 05:30) (16 - 23)  SpO2: 97% (17 Jun 2019 05:30) (96% - 100%)    Gen: NAD  Abd: + BS, soft, nontender, nondistended, no rebound or guarding  Incision clean, dry and intact, provenia intact  uterus firm at midline 2 fb below umbilicus   : lochia WNL  Extremities: no swelling or calf tenderness                          15.8   14.87 )-----------( 212      ( 16 Jun 2019 04:35 )             44.6     06-16    134<L>  |  97  |  7   ----------------------------<  115<H>  4.0   |  20<L>  |  0.83    Ca    8.1<L>      16 Jun 2019 04:35  Mg     6.4     06-17    TPro  7.5  /  Alb  3.9  /  TBili  0.4  /  DBili  x   /  AST  29  /  ALT  28  /  AlkPhos  183<H>  06-16    Magnesium, Serum: 6.4 mg/dL (06-17 @ 01:18)    PT/INR - ( 16 Jun 2019 04:35 )   PT: 10.0 sec;   INR: 0.89          PTT - ( 16 Jun 2019 04:35 )  PTT:28.7 sec    MEDICATIONS  (STANDING):  acetaminophen   Tablet .. 975 milliGRAM(s) Oral every 6 hours  diphtheria/tetanus/pertussis (acellular) Vaccine (ADAcel) 0.5 milliLiter(s) IntraMuscular once  heparin  Injectable 5000 Unit(s) SubCutaneous every 12 hours  lactated ringers. 1000 milliLiter(s) (125 mL/Hr) IV Continuous <Continuous>  magnesium sulfate Infusion 2 Gm/Hr (50 mL/Hr) IV Continuous <Continuous>  oxytocin Infusion 333.333 milliUNIT(s)/Min (1000 mL/Hr) IV Continuous <Continuous>  oxytocin Infusion 333.333 milliUNIT(s)/Min (1000 mL/Hr) IV Continuous <Continuous>    MEDICATIONS  (PRN):  diphenhydrAMINE 25 milliGRAM(s) Oral every 6 hours PRN Itching  docusate sodium 100 milliGRAM(s) Oral two times a day PRN Stool softening  glycerin Suppository - Adult 1 Suppository(s) Rectal at bedtime PRN Constipation  lanolin Ointment 1 Application(s) Topical every 6 hours PRN Sore Nipples  magnesium hydroxide Suspension 30 milliLiter(s) Oral two times a day PRN Constipation  oxyCODONE    IR 5 milliGRAM(s) Oral every 3 hours PRN Moderate Pain (4 - 6)  oxyCODONE    IR 5 milliGRAM(s) Oral once PRN Severe Pain (7 - 10)  simethicone 80 milliGRAM(s) Chew every 4 hours PRN Gas

## 2019-06-17 NOTE — PROGRESS NOTE ADULT - SUBJECTIVE AND OBJECTIVE BOX
Patient evaluated at bedside for clinical magnesium check.     She denies visual disturbances including scotoma, headache and right upper quadrant pain. Also denies nausea/vomiting/epigastric pain/shortness of breath. Pain well controlled.      T(C): 36.8 (06-17-19 @ 00:00), Max: 36.8 (06-16-19 @ 16:00)  HR: 69 (06-17-19 @ 00:00) (69 - 90)  BP: 108/70 (06-17-19 @ 00:00) (107/62 - 126/86)  RR: 19 (06-17-19 @ 00:00) (16 - 23)  SpO2: 96% (06-17-19 @ 00:00) (96% - 99%)    Gen: NAD  Pulm: CTAB  Abd: soft, nontender, no rebound or guarding, no epigastric tenderness, liver nonpalpable +BS, fundus palpated   : Ragland in place  Ext: Reflexes +                          15.8   14.87 )-----------( 212      ( 16 Jun 2019 04:35 )             44.6     06-16    134<L>  |  97  |  7   ----------------------------<  115<H>  4.0   |  20<L>  |  0.83    Ca    8.1<L>      16 Jun 2019 04:35  Mg     5.4     06-16    TPro  7.5  /  Alb  3.9  /  TBili  0.4  /  DBili  x   /  AST  29  /  ALT  28  /  AlkPhos  183<H>  06-16        06-15-19 @ 07:01  -  06-16-19 @ 07:00  --------------------------------------------------------  IN: 200 mL / OUT: 350 mL / NET: -150 mL    06-16-19 @ 07:01  -  06-17-19 @ 01:22  --------------------------------------------------------  IN: 7023 mL / OUT: 4325 mL / NET: 2698 mL        MEDICATIONS  (STANDING):  acetaminophen   Tablet .. 975 milliGRAM(s) Oral every 6 hours  diphtheria/tetanus/pertussis (acellular) Vaccine (ADAcel) 0.5 milliLiter(s) IntraMuscular once  heparin  Injectable 5000 Unit(s) SubCutaneous every 12 hours  lactated ringers. 1000 milliLiter(s) (125 mL/Hr) IV Continuous <Continuous>  magnesium sulfate Infusion 2 Gm/Hr (50 mL/Hr) IV Continuous <Continuous>  oxytocin Infusion 333.333 milliUNIT(s)/Min (1000 mL/Hr) IV Continuous <Continuous>  oxytocin Infusion 333.333 milliUNIT(s)/Min (1000 mL/Hr) IV Continuous <Continuous>    MEDICATIONS  (PRN):  diphenhydrAMINE 25 milliGRAM(s) Oral every 6 hours PRN Itching  docusate sodium 100 milliGRAM(s) Oral two times a day PRN Stool softening  glycerin Suppository - Adult 1 Suppository(s) Rectal at bedtime PRN Constipation  lanolin Ointment 1 Application(s) Topical every 6 hours PRN Sore Nipples  magnesium hydroxide Suspension 30 milliLiter(s) Oral two times a day PRN Constipation  oxyCODONE    IR 5 milliGRAM(s) Oral every 3 hours PRN Moderate Pain (4 - 6)  oxyCODONE    IR 5 milliGRAM(s) Oral once PRN Severe Pain (7 - 10)  simethicone 80 milliGRAM(s) Chew every 4 hours PRN Gas

## 2019-06-18 ENCOUNTER — APPOINTMENT (OUTPATIENT)
Dept: ANTEPARTUM | Facility: CLINIC | Age: 34
End: 2019-06-18

## 2019-06-18 RX ADMIN — Medication 650 MILLIGRAM(S): at 21:43

## 2019-06-18 RX ADMIN — HEPARIN SODIUM 5000 UNIT(S): 5000 INJECTION INTRAVENOUS; SUBCUTANEOUS at 23:07

## 2019-06-18 RX ADMIN — Medication 975 MILLIGRAM(S): at 02:36

## 2019-06-18 RX ADMIN — Medication 975 MILLIGRAM(S): at 10:30

## 2019-06-18 RX ADMIN — Medication 975 MILLIGRAM(S): at 02:16

## 2019-06-18 RX ADMIN — Medication 975 MILLIGRAM(S): at 09:44

## 2019-06-18 RX ADMIN — Medication 975 MILLIGRAM(S): at 14:58

## 2019-06-18 RX ADMIN — Medication 975 MILLIGRAM(S): at 15:40

## 2019-06-18 RX ADMIN — Medication 100 MILLIGRAM(S): at 09:45

## 2019-06-18 RX ADMIN — HEPARIN SODIUM 5000 UNIT(S): 5000 INJECTION INTRAVENOUS; SUBCUTANEOUS at 09:45

## 2019-06-18 RX ADMIN — Medication 975 MILLIGRAM(S): at 22:21

## 2019-06-18 RX ADMIN — Medication 100 MILLIGRAM(S): at 02:16

## 2019-06-19 ENCOUNTER — TRANSCRIPTION ENCOUNTER (OUTPATIENT)
Age: 34
End: 2019-06-19

## 2019-06-19 ENCOUNTER — APPOINTMENT (OUTPATIENT)
Dept: OBGYN | Facility: CLINIC | Age: 34
End: 2019-06-19

## 2019-06-19 VITALS
DIASTOLIC BLOOD PRESSURE: 84 MMHG | RESPIRATION RATE: 18 BRPM | SYSTOLIC BLOOD PRESSURE: 127 MMHG | OXYGEN SATURATION: 98 % | HEART RATE: 90 BPM | TEMPERATURE: 98 F

## 2019-06-19 LAB — SURGICAL PATHOLOGY STUDY: SIGNIFICANT CHANGE UP

## 2019-06-19 PROCEDURE — 86850 RBC ANTIBODY SCREEN: CPT

## 2019-06-19 PROCEDURE — 86780 TREPONEMA PALLIDUM: CPT

## 2019-06-19 PROCEDURE — 84550 ASSAY OF BLOOD/URIC ACID: CPT

## 2019-06-19 PROCEDURE — 86900 BLOOD TYPING SEROLOGIC ABO: CPT

## 2019-06-19 PROCEDURE — 80053 COMPREHEN METABOLIC PANEL: CPT

## 2019-06-19 PROCEDURE — 85730 THROMBOPLASTIN TIME PARTIAL: CPT

## 2019-06-19 PROCEDURE — 85384 FIBRINOGEN ACTIVITY: CPT

## 2019-06-19 PROCEDURE — 83735 ASSAY OF MAGNESIUM: CPT

## 2019-06-19 PROCEDURE — 90707 MMR VACCINE SC: CPT

## 2019-06-19 PROCEDURE — 85610 PROTHROMBIN TIME: CPT

## 2019-06-19 PROCEDURE — 82570 ASSAY OF URINE CREATININE: CPT

## 2019-06-19 PROCEDURE — 83615 LACTATE (LD) (LDH) ENZYME: CPT

## 2019-06-19 PROCEDURE — 99214 OFFICE O/P EST MOD 30 MIN: CPT

## 2019-06-19 PROCEDURE — C1889: CPT

## 2019-06-19 PROCEDURE — 86901 BLOOD TYPING SEROLOGIC RH(D): CPT

## 2019-06-19 PROCEDURE — 88307 TISSUE EXAM BY PATHOLOGIST: CPT

## 2019-06-19 PROCEDURE — 85025 COMPLETE CBC W/AUTO DIFF WBC: CPT

## 2019-06-19 PROCEDURE — 36415 COLL VENOUS BLD VENIPUNCTURE: CPT

## 2019-06-19 PROCEDURE — 84156 ASSAY OF PROTEIN URINE: CPT

## 2019-06-19 RX ADMIN — Medication 325 MILLIGRAM(S): at 05:41

## 2019-06-19 RX ADMIN — Medication 975 MILLIGRAM(S): at 05:42

## 2019-06-19 RX ADMIN — Medication 975 MILLIGRAM(S): at 12:10

## 2019-06-19 RX ADMIN — Medication 975 MILLIGRAM(S): at 11:44

## 2019-06-19 RX ADMIN — Medication 0.5 MILLILITER(S): at 13:46

## 2019-06-19 NOTE — DISCHARGE NOTE OB - CARE PLAN
Principal Discharge DX:	Postpartum state  Goal:	safe discharge to home with precautions  Assessment and plan of treatment:	see below  Secondary Diagnosis:	Preeclampsia  Assessment and plan of treatment:	Follow up within your OB in one week for a blood pressure check. Check bp 3x a day. If blood pressure greater than 160/110, you develop a headache not relieved by tylenol, visual disturbances, or right upper abdominal pain, call your doctor or the hospital, or go to your nearest emergency room. Regular diet, normal activity, nothing in the vagina for 6 weeks--no sex, tampons, tub baths, or swimming pools.

## 2019-06-19 NOTE — PROGRESS NOTE ADULT - SUBJECTIVE AND OBJECTIVE BOX
Patient evaluated at bedside.   She reports pain is well controlled with OPM.  She has been ambulating without assistance, voiding spontaneously, passing gas, tolerating regular diet and is breastfeeding.    She denies HA, changes in vision, RUQ pain, dizziness, CP, palpitations, SOB, n/v, or heavy vaginal bleeding.    Physical Exam:  Vital Signs Last 24 Hrs  T(C): 36.7 (19 Jun 2019 06:00), Max: 37.1 (18 Jun 2019 13:21)  T(F): 98.1 (19 Jun 2019 06:00), Max: 98.7 (18 Jun 2019 13:21)  HR: 72 (19 Jun 2019 06:00) (71 - 98)  BP: 121/82 (19 Jun 2019 06:00) (121/82 - 133/89)  BP(mean): --  RR: 18 (19 Jun 2019 06:00) (18 - 19)  SpO2: 98% (19 Jun 2019 06:00) (98% - 99%)    Gen: NAD  Abd: + BS, soft, nontender, nondistended, no rebound or guarding  Incision clean, dry and intact provenia dressing in place  uterus firm at midline 3 fb below umbilicus  : lochia WNL  Extremities: no swelling or calf tenderness    MEDICATIONS  (STANDING):  acetaminophen   Tablet .. 975 milliGRAM(s) Oral every 6 hours  diphtheria/tetanus/pertussis (acellular) Vaccine (ADAcel) 0.5 milliLiter(s) IntraMuscular once  heparin  Injectable 5000 Unit(s) SubCutaneous every 12 hours  lactated ringers. 1000 milliLiter(s) (125 mL/Hr) IV Continuous <Continuous>  oxytocin Infusion 333.333 milliUNIT(s)/Min (1000 mL/Hr) IV Continuous <Continuous>  oxytocin Infusion 333.333 milliUNIT(s)/Min (1000 mL/Hr) IV Continuous <Continuous>    MEDICATIONS  (PRN):  diphenhydrAMINE 25 milliGRAM(s) Oral every 6 hours PRN Itching  docusate sodium 100 milliGRAM(s) Oral two times a day PRN Stool softening  glycerin Suppository - Adult 1 Suppository(s) Rectal at bedtime PRN Constipation  lanolin Ointment 1 Application(s) Topical every 6 hours PRN Sore Nipples  magnesium hydroxide Suspension 30 milliLiter(s) Oral two times a day PRN Constipation  oxyCODONE    IR 5 milliGRAM(s) Oral every 3 hours PRN Moderate Pain (4 - 6)  oxyCODONE    IR 5 milliGRAM(s) Oral once PRN Severe Pain (7 - 10)  simethicone 80 milliGRAM(s) Chew every 4 hours PRN Gas

## 2019-06-19 NOTE — DISCHARGE NOTE OB - PATIENT PORTAL LINK FT
You can access the [facility] Portal, offered by [facility], by registering with the following website: [email or web address]

## 2019-06-19 NOTE — LACTATION INITIAL EVALUATION - NS LACT CON REASON FOR REQ
primaparous mom/Concerned about milk supply. Infant was initially in the NICU for a 12 hour R/O sepsis. Mom  C/S for HTN disorder. Infant brought to NICU for Mat Temp.

## 2019-06-19 NOTE — LACTATION INITIAL EVALUATION - SUPPLEMENT
Requested by Pediatrian as he was passing Uric Acid Crystals yesterday , day 3. Triple feeding care plan reviewed and a copy provided. Mom expressed understanding of teaching

## 2019-06-19 NOTE — DISCHARGE NOTE OB - CARE PROVIDER_API CALL
Benjamin Hunter)  Obstetrics and Gynecology  [address], [facility] [place] [address]  Phone: [phone]  Fax: [phone]  Follow Up Time:

## 2019-06-19 NOTE — DISCHARGE NOTE OB - PLAN OF CARE
safe discharge to home with precautions see below Follow up within your OB in one week for a blood pressure check. Check bp 3x a day. If blood pressure greater than 160/110, you develop a headache not relieved by tylenol, visual disturbances, or right upper abdominal pain, call your doctor or the hospital, or go to your nearest emergency room. Regular diet, normal activity, nothing in the vagina for 6 weeks--no sex, tampons, tub baths, or swimming pools.

## 2019-06-19 NOTE — LACTATION INITIAL EVALUATION - PRO FEEDING PLAN INFANT OB
Infant  from mom for 12 hours after birth. Supplemental milk provided. , Triple feeding recommended until mom's milk supply increases../breast and formula feeding

## 2019-06-19 NOTE — LACTATION INITIAL EVALUATION - INFANT FEEDING PLAN COMMENT, OB PROFILE
Triple Feeding Plan Care includes; offering the breast at each feeding time, limit nursing to 15 minutes each breast or 20-30 minutes total , especially for  babies. Providing supplemental milk in recommended volume according to age;Day 1, 2 -10 cc's, day 2, 5-15 cc's, day 3, 15 -30 cc's, day 4, 30-60cc's, at least 8 times a day (q3h). Pumping after nursing first or an attempt at nursing, to secure milk supply and provide expressed milk as a supplement.  Ped recommended supplementation

## 2019-06-19 NOTE — PROGRESS NOTE ADULT - ASSESSMENT
33y Female POD# 3   s/p C/S, complicated PEC w/ SF  patient would like to go home today.                                 PEC w/ SF: normotensvie overnight, no toxic complaints, will continue to monitor   1. Neuro/Pain:  OPM  2  CV:  VS per routine  3. Pulm: Encourage ISS & Ambulation  4. GI:  Reg  5. : Voiding  6. DVT ppx: SCDs, SQH 5000 mg BID  7. Dispo: POD #3 or #4

## 2019-06-22 DIAGNOSIS — Z3A.39 39 WEEKS GESTATION OF PREGNANCY: ICD-10-CM

## 2019-06-22 LAB
GP B STREP DNA SPEC QL NAA+PROBE: DETECTED
GP B STREP DNA SPEC QL NAA+PROBE: NORMAL
SOURCE GBS: NORMAL

## 2019-06-26 ENCOUNTER — APPOINTMENT (OUTPATIENT)
Dept: OBGYN | Facility: CLINIC | Age: 34
End: 2019-06-26
Payer: COMMERCIAL

## 2019-06-26 VITALS — HEIGHT: 63 IN | WEIGHT: 164 LBS | BODY MASS INDEX: 29.06 KG/M2

## 2019-06-26 PROCEDURE — 0503F POSTPARTUM CARE VISIT: CPT

## 2019-06-28 NOTE — END OF VISIT
[FreeTextEntry3] : 2 week PO visit.\par Patient is feeling well. \par prevena removed and incision is clean and dry.\par Discussed contraception with the patient. \par Follow up in 4 weeks for PP visit

## 2019-06-28 NOTE — HISTORY OF PRESENT ILLNESS
[Delivery Date: ___] : on [unfilled] [Wt. ___] : weighing [unfilled] [NICU: ___] : NICU: [unfilled] [Pertussis Vaccine] : Pertussis vaccine administered [Incisional Pain] : incisional pain [Leg Pain] : leg pain [Vaginal Discharge] : vaginal discharge [Fatigue] : fatigue [BF with Difficulty] : nursing without difficulty [Resumed Menses] : has not resumed her menses [Resumed [place]] : has not resumed intercourse [Postpartum Follow Up] : postpartum follow up [Complications:___] : no complications [Primary C/S] : delivered by  section [Male] : Delivery History: baby boy [Breastfeeding] : currently nursing [None] : No associated symptoms are reported [Clean/Dry/Intact] : clean, dry and intact [Erythema] : not erythematous [Swelling] : not swollen [Dehiscence] : not dehisced [Healed] : healed

## 2019-07-26 ENCOUNTER — APPOINTMENT (OUTPATIENT)
Dept: OBGYN | Facility: CLINIC | Age: 34
End: 2019-07-26
Payer: COMMERCIAL

## 2019-07-26 VITALS
WEIGHT: 154 LBS | SYSTOLIC BLOOD PRESSURE: 120 MMHG | BODY MASS INDEX: 27.29 KG/M2 | DIASTOLIC BLOOD PRESSURE: 80 MMHG | HEIGHT: 63 IN

## 2019-07-26 PROCEDURE — 76830 TRANSVAGINAL US NON-OB: CPT

## 2019-07-26 PROCEDURE — 0503F POSTPARTUM CARE VISIT: CPT

## 2019-07-26 NOTE — HISTORY OF PRESENT ILLNESS
[Delivery Date: ___] : on [unfilled] [FreeTextEntry8] : Follow up post partum check up. [Postpartum Follow Up] : postpartum follow up [Complications:___] : no complications [Primary C/S] : delivered by  section [Male] : Delivery History: baby boy [Breastfeeding] : not currently nursing [BF with Difficulty] : nursing without difficulty [S/Sx PP Depression] : no signs/symptoms of postpartum depression [Clean/Dry/Intact] : clean, dry and intact [Erythema] : not erythematous [Healed] : healed [Back to Normal] : is back to normal in size [Mild] : mild vaginal bleeding [Normal] : the vagina was normal [Cervix Sample Taken] : cervical sample not taken for a Pap smear [Not Done] : Examination of breasts not done [Doing Well] : is doing well [No Sign of Infection] : is showing no signs of infection [Excellent Pain Control] : has excellent pain control [None] : None [de-identified] : still spotting.

## 2019-07-26 NOTE — END OF VISIT
[FreeTextEntry3] : TVUS done given still spotting. There are some clots without flows on the sonogram, with endometrial lining up to 1.8mc. The patient denies pain. \par Desires IUD contraception, will make appointment for insertion as she does not have time now.

## 2019-07-30 LAB — HPV HIGH+LOW RISK DNA PNL CVX: NOT DETECTED

## 2019-08-02 LAB — CYTOLOGY CVX/VAG DOC THIN PREP: NORMAL

## 2019-08-05 ENCOUNTER — APPOINTMENT (OUTPATIENT)
Dept: OBGYN | Facility: CLINIC | Age: 34
End: 2019-08-05
Payer: COMMERCIAL

## 2019-08-05 DIAGNOSIS — Z30.430 ENCOUNTER FOR INSERTION OF INTRAUTERINE CONTRACEPTIVE DEVICE: ICD-10-CM

## 2019-08-05 PROCEDURE — 58300 INSERT INTRAUTERINE DEVICE: CPT

## 2019-09-23 ENCOUNTER — APPOINTMENT (OUTPATIENT)
Dept: OBGYN | Facility: CLINIC | Age: 34
End: 2019-09-23
Payer: COMMERCIAL

## 2019-09-23 PROCEDURE — 99212 OFFICE O/P EST SF 10 MIN: CPT

## 2020-01-16 ENCOUNTER — APPOINTMENT (OUTPATIENT)
Dept: OBGYN | Facility: CLINIC | Age: 35
End: 2020-01-16

## 2021-01-12 NOTE — PATIENT PROFILE OB - PRO HBSAG INFANT
Chief Complaint   Patient presents with   • Follow-up     fu memory testing       HPI:  Comes in accompanied by her daughter Nicole and her  Florencio joins us by phone.  They state that since last visit she is getting a little more forgetful.  They states she has good days and bad days with the bad days are starting to out number the good days.  She is very repetitive at this point time.  She is having problems spelling which was something she did excel at previously.  She has homes with writing out checks but they are letting her do this with some observation because this makes her feel good.  If he does try and help her out with things she will not let him.  An example of this is taking down the [place] tree she would not let him help but at the same time she would hold her head as if it hurts because he was confused on what she was doing.  They state that this all did get worse after she had COVID recently.  There are no other behavioral issues or functional decline.  She is on donepezil 10 mg daily and memantine 10 mg twice daily.  ALLERGIES:   Allergen Reactions   • Zithromax [Azithromycin] Other (See Comments)     Throat swells   • Simvastatin MYALGIA       Current Outpatient Medications   Medication Sig Dispense Refill   • donepezil (ARICEPT) 10 MG tablet Take 1.5 tablets by mouth nightly. 30 tablet 11   • amLODIPine (NORVASC) 10 MG tablet Take 1 tablet by mouth daily. 30 tablet 11   • lisinopril (ZESTRIL) 20 MG tablet Take 1 tablet by mouth daily. 30 tablet 11   • rosuvastatin (Crestor) 10 MG tablet Take 1 tablet by mouth daily. 30 tablet 11   • pantoprazole (PROTONIX) 40 MG tablet TAKE 1 TABLET BY MOUTH DAILY. 30 tablet 5   • memantine (NAMENDA) 10 MG tablet TAKE 1 TABLET BY MOUTH 2 TIMES DAILY. 60 tablet 2   • Cholecalciferol (VITAMIN D) 2000 units capsule Take 2,000 Units by mouth.     • aspirin 81 MG tablet Take 81 mg by mouth daily.     • blood glucose test strip Test blood sugar one time daily  varying time of day and as needed directed. Diagnosis: E16.2. Meter: per insurance coverage 100 strip 3   • blood glucose meter Test blood sugar one time daily varying time of day and as needed directed. Diagnosis: E16.2. Meter: per insurance coverage 1 kit 0   • blood glucose lancets Test blood sugar one time daily varying time of day and as needed directed. Diagnosis: E16.2. Meter: per insurance coverage 100 each 3     No current facility-administered medications for this visit.        Past Medical History:   Diagnosis Date   • Elevated cholesterol    • Essential (primary) hypertension    • Imbalance of muscle of eye     Aug 2013 with prism in OD   • Proteinuria     pt states she is not aware of this   • Pseudophakia of both eyes    • Thyroid nodule    • Vitamin D deficiency        Past Surgical History:   Procedure Laterality Date   • Carpal tunnel release      right hand   • Cataract extraction, bilateral      Dr Joel   • Colonoscopy  08/31/2016; 7/6/2011   • Esophagogastroduodenoscopy  07/06/2011   • Eye surgery  08/2018    Dr. Boateng   • Levator advancement/resection Bilateral 07/17/2018    Bilateral upper blepharoplasty, Bilateral direct browplasty and Bilateral upper lid internal levator resection    • Tonsillectomy     • Us guide thyroid biopsy/fna aspiration  8/11/2015; 7/30/2015       Family History   Problem Relation Age of Onset   • Cancer Mother         cervical   • Multiple Sclerosis Mother    • Cancer Father         throat   • Cancer Brother         prostate   • Cancer Son         hodgkins?       Social History     Tobacco Use   • Smoking status: Never Smoker   • Smokeless tobacco: Never Used   Substance Use Topics   • Alcohol use: Not Currently     Alcohol/week: 0.0 standard drinks     Comment: rarely   • Drug use: No       ROS:  As per HPI    PHYSICAL EXAMINATION:   VITALS:   Visit Vitals  /58   Pulse 78   Temp 97.5 °F (36.4 °C) (Temporal)   Resp 18   Ht 4' 11.25\" (1.505 m)   Wt 69 kg    SpO2 97%   BMI 30.47 kg/m²        Wt Readings from Last 4 Encounters:   01/12/21 69 kg   12/16/20 70.7 kg   11/10/20 68.9 kg   10/19/20 67.8 kg          GEN: AAO x 3. No acute pain or distress.   NEURO  No focal abnormalities. Cognitive studies as below  PSYCH:  Mood and affect is good.   GAIT: Stance and gait are normal.     Geriatric Depression Scale  Score: 1      [facility] Mental Status Exam (UMS)   Score: 14   [place]:   High school educated: 27-30 Normal; 21-26 Mild cognitive impairment; 1-20 Dementia  Less than high school educated: 25-30 Normal; 20-24 Mild cognitive impairment; 1-19 Dementia    RBANS:          ASSESSMENT:  ED Diagnosis   1. Late onset Alzheimer's disease without behavioral disturbance (CMS/HCC)           PLAN:  Patient Instructions   There has been a significant change in all of the scores on your test. This is likely due to COVID as well as progression of the day      Increase donepezil to 15 mg daily    It is no longer safe to drive     Continue to remain mentally, physically and socially active    Follow up in 6 months with testing         Of a 70  Minute visit more than 50% was spent in counseling and coordination of care regarding issues above                  negative

## 2021-11-17 ENCOUNTER — APPOINTMENT (OUTPATIENT)
Dept: OBGYN | Facility: CLINIC | Age: 36
End: 2021-11-17
Payer: COMMERCIAL

## 2021-11-17 VITALS
HEIGHT: 63 IN | WEIGHT: 150 LBS | DIASTOLIC BLOOD PRESSURE: 70 MMHG | SYSTOLIC BLOOD PRESSURE: 110 MMHG | BODY MASS INDEX: 26.58 KG/M2

## 2021-11-17 DIAGNOSIS — Z30.431 ENCOUNTER FOR ROUTINE CHECKING OF INTRAUTERINE CONTRACEPTIVE DEVICE: ICD-10-CM

## 2021-11-17 PROCEDURE — 99395 PREV VISIT EST AGE 18-39: CPT

## 2021-11-17 NOTE — HISTORY OF PRESENT ILLNESS
[Patient reported PAP Smear was normal] : Patient reported PAP Smear was normal [N] : Patient reports normal menses [IUD] : has an intrauterine device [Y] : Positive pregnancy history [unknown] : Patient is unsure of the date of her LMP [Currently Active] : currently active [Men] : men [No] : No [Yes] : Yes [Patient refuses STI testing] : Patient refuses STI testing [FreeTextEntry1] : RANDALL SOMERS is a 36 year old  female that presents without complaints for a routine well woman exam. Patient would like her IUD placement to be confirmed at this time and to discuss potential removal for conception. \par \par Ms. Somers reports that she and her  are talking about having a second child and she has been counseled that she can come back to the office for IUD removal and begin trying for conception.\par \par Patient denies any history of abnormal pap smears and has been advised that she may wait another year before having one done at her annual visit. Denies pap smear today.\par \par No GYN concerns at this time. Patient has not had periods over the past year due to Mirena IUD- denies any complications with IUD.\par \par OB hx: 1 full term c/s for PEC [PapSmeardate] : 07/20 [de-identified] : Mirena [PGxTotal] : 1 [[facility]] : 1 [[facility]] : 1

## 2021-11-17 NOTE — PHYSICAL EXAM
[Chaperone Present] : A chaperone was present in the examining room during all aspects of the physical examination [Appropriately responsive] : appropriately responsive [Alert] : alert [No Acute Distress] : no acute distress [Soft] : soft [Non-tender] : non-tender [Non-distended] : non-distended [No HSM] : No HSM [No Lesions] : no lesions [No Mass] : no mass [Oriented x3] : oriented x3 [Examination Of The Breasts] : a normal appearance [No Masses] : no breast masses were palpable [Labia Majora] : normal [Labia Minora] : normal [IUD String] : an IUD string was noted [Normal] : normal [Uterine Adnexae] : normal [FreeTextEntry8] : no pain or renderness

## 2021-11-17 NOTE — PLAN
[FreeTextEntry1] : Patient to RTO for IUD removal or sooner with any GYN concerns. \par \par All questions answered and patient is in agreement and understanding of plan.

## 2022-02-14 ENCOUNTER — NON-APPOINTMENT (OUTPATIENT)
Age: 37
End: 2022-02-14

## 2022-02-16 ENCOUNTER — APPOINTMENT (OUTPATIENT)
Dept: OBGYN | Facility: CLINIC | Age: 37
End: 2022-02-16
Payer: COMMERCIAL

## 2022-02-16 DIAGNOSIS — Z30.432 ENCOUNTER FOR REMOVAL OF INTRAUTERINE CONTRACEPTIVE DEVICE: ICD-10-CM

## 2022-02-16 PROCEDURE — 58301 REMOVE INTRAUTERINE DEVICE: CPT

## 2022-02-16 NOTE — PLAN
[FreeTextEntry1] : Patient desires pregnancy and will return to office with any questions/concerns regarding trying to conceive or any GYN concerns. \par \par \par All questions answered and patient is in agreement and understanding of plan.

## 2022-02-16 NOTE — PROCEDURE
[IUD Removal] : intrauterine device (IUD) removal [Time out performed] : Pre-procedure time out performed.  Patient's name, date of birth and procedure confirmed. [Consent Obtained] : Consent obtained [Fertility Desired] : fertility desired [Risks] : risks [Benefits] : benefits [Alternatives] : alternatives [Patient] : patient [Speculum Placed] : speculum placed [IUD Removed - Forceps] : IUD removed - forceps [IUD Discarded] : IUD discarded [Sent to Pathology] : specimen was placed in buffered formalin and sent for pathology [Tolerated Well] : Patient tolerated the procedure well [No Complications] : no complications [Heavy Vaginal Bleeding] : for heavy vaginal bleeding [Pelvic Pain] : for pelvic pain [PRN] : as needed

## 2022-08-17 ENCOUNTER — NON-APPOINTMENT (OUTPATIENT)
Age: 37
End: 2022-08-17

## 2022-08-17 ENCOUNTER — APPOINTMENT (OUTPATIENT)
Dept: OBGYN | Facility: CLINIC | Age: 37
End: 2022-08-17

## 2022-08-17 ENCOUNTER — TRANSCRIPTION ENCOUNTER (OUTPATIENT)
Age: 37
End: 2022-08-17

## 2022-08-17 VITALS — DIASTOLIC BLOOD PRESSURE: 77 MMHG | BODY MASS INDEX: 27.63 KG/M2 | SYSTOLIC BLOOD PRESSURE: 111 MMHG | WEIGHT: 156 LBS

## 2022-08-17 PROCEDURE — 36415 COLL VENOUS BLD VENIPUNCTURE: CPT

## 2022-08-17 PROCEDURE — 81025 URINE PREGNANCY TEST: CPT

## 2022-08-17 PROCEDURE — 76817 TRANSVAGINAL US OBSTETRIC: CPT

## 2022-08-17 PROCEDURE — 0500F INITIAL PRENATAL CARE VISIT: CPT

## 2022-08-17 NOTE — COUNSELING
[Nutrition/ Exercise/ Weight Management] : nutrition, exercise, weight management [Vitamins/Supplements] : vitamins/supplements [Drugs/Alcohol] : drugs, alcohol [Vaccines] : vaccines [FreeTextEntry2] : TRAVEL & DIET RESTRICTIONS

## 2022-08-17 NOTE — PROCEDURE
[Cervical Pap Smear] : cervical Pap smear [Liquid Base] : liquid base [GC Chlamydia Culture] : GC Chlamydia Culture [Affirm (Triple Culture)] : Affirm (triple culture) [Tolerated Well] : the patient tolerated the procedure well [No Complications] : there were no complications [Transvaginal OB Sonogram] : Transvaginal OB Sonogram [Intrauterine Pregnancy] : intrauterine pregnancy [Yolk Sac] : yolk sac present [Fetal Heart] : fetal heart present [CRL: ___ (mm)] : CRL - [unfilled]Umm [Date: ___] : Date: [unfilled] [Current GA by Sonogram: ___ (wks)] : Current GA by Sonogram: [unfilled]Uwks [___ day(s)] : [unfilled] days [Transvaginal OB Sonogram WNL] : Transvaginal OB Sonogram WNL [FreeTextEntry1] :  BPM

## 2022-08-17 NOTE — HISTORY OF PRESENT ILLNESS
[Patient reported PAP Smear was normal] : Patient reported PAP Smear was normal [N] : Patient does not use contraception [Y] : Patient is sexually active [Normal Amount/Duration] :  normal amount and duration [Regular Cycle Intervals] : periods have been regular [No] : Patient does not have concerns regarding sex [Currently Active] : currently active [PapSmeardate] : 07/19 [LMPDate] : 06/16/22 [FreeTextEntry1] : 06/16/22

## 2022-08-18 ENCOUNTER — NON-APPOINTMENT (OUTPATIENT)
Age: 37
End: 2022-08-18

## 2022-08-19 ENCOUNTER — NON-APPOINTMENT (OUTPATIENT)
Age: 37
End: 2022-08-19

## 2022-08-22 ENCOUNTER — NON-APPOINTMENT (OUTPATIENT)
Age: 37
End: 2022-08-22

## 2022-08-24 ENCOUNTER — NON-APPOINTMENT (OUTPATIENT)
Age: 37
End: 2022-08-24

## 2022-08-26 ENCOUNTER — NON-APPOINTMENT (OUTPATIENT)
Age: 37
End: 2022-08-26

## 2022-08-31 ENCOUNTER — APPOINTMENT (OUTPATIENT)
Dept: OBGYN | Facility: CLINIC | Age: 37
End: 2022-08-31

## 2022-08-31 PROCEDURE — 36415 COLL VENOUS BLD VENIPUNCTURE: CPT

## 2022-09-12 NOTE — DISCHARGE NOTE OB - USE THE FOOD PYRAMID (LOCATED IN DISCHARGE MATERIALS PROVIDED) AS A GUIDE TO BALANCE AND MODERATION
Date of Service: 08/31/2022    COMPREHENSIVE MEDICAL NEUROPSYCHIATRIC 25-MINUTE TELEPHONIC FOLLOWUP EVALUATION    SERVICE CODE:   [id number].    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     Bhupendra is very pleasant and interactive in this consultative telephonic session.  She shares the severe dysphoria, anhedonia, melancholia, wide mood lability, and agitation that troubled her so significantly in the past are fundamentally well controlled at present; however, residual anxious labile dysphoria is noted episodically.    MENTAL STATUS EXAMINATION:    Reveals no signs of thought disorder, psychosis, nor of organic mental disease.  Mood is euthymic and well modulated.  Affect is appropriate to content.  Bhupendra denies suicidal, homicidal, and all other violent ideation on exam.    A 14-point medical review of systems is negative or noncontributory aside from that described above.  Alcohol and other drug abuse are denied.     Bhupendra is provided a comprehensive medical neuropsychiatric medication regimen as thoroughly reviewed, discussed, adjusted and prescribed as per Epic.    CONTINUE ACTIVE CARE AND TREATMENT PLAN:   Return to clinic for followup.      Dictated By: Ken Ribeiro MD  Signing Provider: MD JERRELL Cruz/stephen ([id number])   DD: 09/08/2022 11:15:34 AM TD: 09/12/2022 12:55:26 AM      
Statement Selected

## 2022-09-13 LAB
ABO + RH PNL BLD: NORMAL
ALBUMIN SERPL ELPH-MCNC: 4.4 G/DL
ALP BLD-CCNC: 43 U/L
ALT SERPL-CCNC: 20 U/L
ANION GAP SERPL CALC-SCNC: 11 MMOL/L
AR GENE MUT ANL BLD/T: NORMAL
AST SERPL-CCNC: 15 U/L
B19V IGG SER QL IA: 1.2 INDEX
B19V IGG+IGM SER-IMP: NORMAL
B19V IGG+IGM SER-IMP: POSITIVE
B19V IGM FLD-ACNC: 0.12 INDEX
B19V IGM SER-ACNC: NEGATIVE
BACTERIA UR CULT: NORMAL
BASOPHILS # BLD AUTO: 0.03 K/UL
BASOPHILS NFR BLD AUTO: 0.4 %
BILIRUB SERPL-MCNC: 0.3 MG/DL
BLD GP AB SCN SERPL QL: NORMAL
BUN SERPL-MCNC: 9 MG/DL
C TRACH RRNA SPEC QL NAA+PROBE: NOT DETECTED
CALCIUM SERPL-MCNC: 9.3 MG/DL
CANDIDA VAG CYTO: NOT DETECTED
CFTR MUT TESTED BLD/T: NEGATIVE
CHLORIDE SERPL-SCNC: 102 MMOL/L
CMV IGG SERPL QL: 6.5 U/ML
CMV IGG SERPL-IMP: POSITIVE
CMV IGM SERPL QL: <8 AU/ML
CMV IGM SERPL QL: NEGATIVE
CO2 SERPL-SCNC: 23 MMOL/L
CREAT SERPL-MCNC: 0.71 MG/DL
CYTOLOGY CVX/VAG DOC THIN PREP: NORMAL
EGFR: 113 ML/MIN/1.73M2
EOSINOPHIL # BLD AUTO: 0.05 K/UL
EOSINOPHIL NFR BLD AUTO: 0.7 %
FMR1 GENE MUT ANL BLD/T: NORMAL
G VAGINALIS+PREV SP MTYP VAG QL MICRO: NOT DETECTED
GLUCOSE SERPL-MCNC: 84 MG/DL
HBV SURFACE AG SER QL: NONREACTIVE
HCT VFR BLD CALC: 37.9 %
HCV AB SER QL: NONREACTIVE
HCV S/CO RATIO: 0.17 S/CO
HGB A MFR BLD: 96.6 %
HGB A2 MFR BLD: 2.8 %
HGB BLD-MCNC: 13.1 G/DL
HGB F MFR BLD: 0.6 %
HGB FRACT BLD-IMP: NORMAL
HIV1+2 AB SPEC QL IA.RAPID: NONREACTIVE
IMM GRANULOCYTES NFR BLD AUTO: 0.3 %
LYMPHOCYTES # BLD AUTO: 1.65 K/UL
LYMPHOCYTES NFR BLD AUTO: 22.7 %
MAN DIFF?: NORMAL
MCHC RBC-ENTMCNC: 33.2 PG
MCHC RBC-ENTMCNC: 34.6 GM/DL
MCV RBC AUTO: 96.2 FL
MEV IGG FLD QL IA: 31.8 AU/ML
MEV IGG+IGM SER-IMP: POSITIVE
MONOCYTES # BLD AUTO: 0.51 K/UL
MONOCYTES NFR BLD AUTO: 7 %
MUV AB SER-ACNC: POSITIVE
MUV IGG SER QL IA: 201 AU/ML
N GONORRHOEA RRNA SPEC QL NAA+PROBE: NOT DETECTED
NEUTROPHILS # BLD AUTO: 5.02 K/UL
NEUTROPHILS NFR BLD AUTO: 68.9 %
PLATELET # BLD AUTO: 216 K/UL
POTASSIUM SERPL-SCNC: 4.2 MMOL/L
PROT SERPL-MCNC: 6.8 G/DL
RBC # BLD: 3.94 M/UL
RBC # FLD: 12.2 %
RUBV IGG FLD-ACNC: 4.6 INDEX
RUBV IGG SER-IMP: POSITIVE
SODIUM SERPL-SCNC: 136 MMOL/L
SOURCE AMPLIFICATION: NORMAL
T GONDII AB SER-IMP: NEGATIVE
T GONDII AB SER-IMP: NEGATIVE
T GONDII IGG SER QL: <3 IU/ML
T GONDII IGM SER QL: <3 AU/ML
T PALLIDUM AB SER QL IA: NEGATIVE
T VAGINALIS VAG QL WET PREP: NOT DETECTED
TSH SERPL-ACNC: 0.82 UIU/ML
VZV AB TITR SER: POSITIVE
VZV IGG SER IF-ACNC: 330.8 INDEX
WBC # FLD AUTO: 7.28 K/UL

## 2022-09-21 ENCOUNTER — APPOINTMENT (OUTPATIENT)
Dept: OBGYN | Facility: CLINIC | Age: 37
End: 2022-09-21

## 2022-09-21 VITALS
HEIGHT: 63 IN | WEIGHT: 158.93 LBS | OXYGEN SATURATION: 98 % | SYSTOLIC BLOOD PRESSURE: 121 MMHG | BODY MASS INDEX: 28.16 KG/M2 | DIASTOLIC BLOOD PRESSURE: 85 MMHG

## 2022-09-21 DIAGNOSIS — O09.529 SUPERVISION OF ELDERLY MULTIGRAVIDA, UNSPECIFIED TRIMESTER: ICD-10-CM

## 2022-09-21 PROCEDURE — 36415 COLL VENOUS BLD VENIPUNCTURE: CPT

## 2022-09-21 PROCEDURE — 0502F SUBSEQUENT PRENATAL CARE: CPT

## 2022-09-21 RX ORDER — ASPIRIN 81 MG/1
81 TABLET, CHEWABLE ORAL
Qty: 90 | Refills: 2 | Status: ACTIVE | COMMUNITY
Start: 2022-09-21 | End: 1900-01-01

## 2022-10-10 ENCOUNTER — ASOB RESULT (OUTPATIENT)
Age: 37
End: 2022-10-10

## 2022-10-10 ENCOUNTER — APPOINTMENT (OUTPATIENT)
Dept: ANTEPARTUM | Facility: CLINIC | Age: 37
End: 2022-10-10

## 2022-10-10 PROCEDURE — 76817 TRANSVAGINAL US OBSTETRIC: CPT | Mod: 59

## 2022-10-10 PROCEDURE — 76811 OB US DETAILED SNGL FETUS: CPT

## 2022-10-26 ENCOUNTER — NON-APPOINTMENT (OUTPATIENT)
Age: 37
End: 2022-10-26

## 2022-10-26 ENCOUNTER — APPOINTMENT (OUTPATIENT)
Dept: OBGYN | Facility: CLINIC | Age: 37
End: 2022-10-26

## 2022-10-26 VITALS
HEART RATE: 86 BPM | HEIGHT: 63 IN | BODY MASS INDEX: 28.88 KG/M2 | DIASTOLIC BLOOD PRESSURE: 76 MMHG | WEIGHT: 163 LBS | SYSTOLIC BLOOD PRESSURE: 115 MMHG | OXYGEN SATURATION: 100 %

## 2022-10-26 PROCEDURE — 0502F SUBSEQUENT PRENATAL CARE: CPT

## 2022-10-31 ENCOUNTER — NON-APPOINTMENT (OUTPATIENT)
Age: 37
End: 2022-10-31

## 2022-11-15 ENCOUNTER — ASOB RESULT (OUTPATIENT)
Age: 37
End: 2022-11-15

## 2022-11-15 ENCOUNTER — NON-APPOINTMENT (OUTPATIENT)
Age: 37
End: 2022-11-15

## 2022-11-15 ENCOUNTER — APPOINTMENT (OUTPATIENT)
Dept: ANTEPARTUM | Facility: CLINIC | Age: 37
End: 2022-11-15

## 2022-11-15 PROCEDURE — 76817 TRANSVAGINAL US OBSTETRIC: CPT

## 2022-11-15 PROCEDURE — 76816 OB US FOLLOW-UP PER FETUS: CPT

## 2022-11-18 ENCOUNTER — NON-APPOINTMENT (OUTPATIENT)
Age: 37
End: 2022-11-18

## 2022-11-30 ENCOUNTER — APPOINTMENT (OUTPATIENT)
Dept: OBGYN | Facility: CLINIC | Age: 37
End: 2022-11-30

## 2022-11-30 VITALS
BODY MASS INDEX: 29.41 KG/M2 | DIASTOLIC BLOOD PRESSURE: 79 MMHG | HEART RATE: 99 BPM | HEIGHT: 63 IN | WEIGHT: 166 LBS | SYSTOLIC BLOOD PRESSURE: 113 MMHG | RESPIRATION RATE: 100 BRPM

## 2022-11-30 PROCEDURE — 0502F SUBSEQUENT PRENATAL CARE: CPT

## 2022-12-01 ENCOUNTER — TRANSCRIPTION ENCOUNTER (OUTPATIENT)
Age: 37
End: 2022-12-01

## 2022-12-01 LAB
BASOPHILS # BLD AUTO: 0.04 K/UL
BASOPHILS NFR BLD AUTO: 0.4 %
EOSINOPHIL # BLD AUTO: 0.1 K/UL
EOSINOPHIL NFR BLD AUTO: 0.9 %
GLUCOSE 1H P 50 G GLC PO SERPL-MCNC: 100 MG/DL
HCT VFR BLD CALC: 35.6 %
HGB BLD-MCNC: 12.8 G/DL
IMM GRANULOCYTES NFR BLD AUTO: 0.6 %
LYMPHOCYTES # BLD AUTO: 1.47 K/UL
LYMPHOCYTES NFR BLD AUTO: 13.9 %
MAN DIFF?: NORMAL
MCHC RBC-ENTMCNC: 35.6 PG
MCHC RBC-ENTMCNC: 36 GM/DL
MCV RBC AUTO: 98.9 FL
MONOCYTES # BLD AUTO: 0.77 K/UL
MONOCYTES NFR BLD AUTO: 7.3 %
NEUTROPHILS # BLD AUTO: 8.14 K/UL
NEUTROPHILS NFR BLD AUTO: 76.9 %
PLATELET # BLD AUTO: 233 K/UL
RBC # BLD: 3.6 M/UL
RBC # FLD: 12.9 %
T PALLIDUM AB SER QL IA: NEGATIVE
WBC # FLD AUTO: 10.58 K/UL

## 2022-12-05 ENCOUNTER — NON-APPOINTMENT (OUTPATIENT)
Age: 37
End: 2022-12-05

## 2022-12-05 ENCOUNTER — ASOB RESULT (OUTPATIENT)
Age: 37
End: 2022-12-05

## 2022-12-05 ENCOUNTER — APPOINTMENT (OUTPATIENT)
Dept: ANTEPARTUM | Facility: CLINIC | Age: 37
End: 2022-12-05

## 2022-12-05 PROCEDURE — 76816 OB US FOLLOW-UP PER FETUS: CPT

## 2022-12-28 ENCOUNTER — APPOINTMENT (OUTPATIENT)
Dept: OBGYN | Facility: CLINIC | Age: 37
End: 2022-12-28
Payer: COMMERCIAL

## 2022-12-28 VITALS
SYSTOLIC BLOOD PRESSURE: 108 MMHG | OXYGEN SATURATION: 98 % | DIASTOLIC BLOOD PRESSURE: 73 MMHG | BODY MASS INDEX: 30.47 KG/M2 | WEIGHT: 172 LBS | HEART RATE: 89 BPM

## 2022-12-28 DIAGNOSIS — Z23 ENCOUNTER FOR IMMUNIZATION: ICD-10-CM

## 2022-12-28 PROCEDURE — 90715 TDAP VACCINE 7 YRS/> IM: CPT

## 2022-12-28 PROCEDURE — 90686 IIV4 VACC NO PRSV 0.5 ML IM: CPT

## 2022-12-28 PROCEDURE — 90472 IMMUNIZATION ADMIN EACH ADD: CPT

## 2022-12-28 PROCEDURE — 0502F SUBSEQUENT PRENATAL CARE: CPT

## 2022-12-28 PROCEDURE — 90471 IMMUNIZATION ADMIN: CPT

## 2023-01-05 ENCOUNTER — ASOB RESULT (OUTPATIENT)
Age: 38
End: 2023-01-05

## 2023-01-05 ENCOUNTER — NON-APPOINTMENT (OUTPATIENT)
Age: 38
End: 2023-01-05

## 2023-01-05 ENCOUNTER — APPOINTMENT (OUTPATIENT)
Dept: ANTEPARTUM | Facility: CLINIC | Age: 38
End: 2023-01-05
Payer: COMMERCIAL

## 2023-01-05 PROCEDURE — 76819 FETAL BIOPHYS PROFIL W/O NST: CPT

## 2023-01-05 PROCEDURE — 76817 TRANSVAGINAL US OBSTETRIC: CPT | Mod: 59

## 2023-01-05 PROCEDURE — 76816 OB US FOLLOW-UP PER FETUS: CPT | Mod: 59

## 2023-01-18 ENCOUNTER — APPOINTMENT (OUTPATIENT)
Dept: OBGYN | Facility: CLINIC | Age: 38
End: 2023-01-18
Payer: COMMERCIAL

## 2023-01-18 VITALS
BODY MASS INDEX: 31.53 KG/M2 | WEIGHT: 178 LBS | DIASTOLIC BLOOD PRESSURE: 86 MMHG | SYSTOLIC BLOOD PRESSURE: 122 MMHG | OXYGEN SATURATION: 98 %

## 2023-01-18 PROCEDURE — 0502F SUBSEQUENT PRENATAL CARE: CPT

## 2023-01-26 ENCOUNTER — ASOB RESULT (OUTPATIENT)
Age: 38
End: 2023-01-26

## 2023-01-26 ENCOUNTER — NON-APPOINTMENT (OUTPATIENT)
Age: 38
End: 2023-01-26

## 2023-01-26 ENCOUNTER — APPOINTMENT (OUTPATIENT)
Dept: ANTEPARTUM | Facility: CLINIC | Age: 38
End: 2023-01-26
Payer: COMMERCIAL

## 2023-01-26 PROCEDURE — 76816 OB US FOLLOW-UP PER FETUS: CPT | Mod: 59

## 2023-01-26 PROCEDURE — 76820 UMBILICAL ARTERY ECHO: CPT | Mod: 59

## 2023-01-26 PROCEDURE — 76819 FETAL BIOPHYS PROFIL W/O NST: CPT

## 2023-02-01 ENCOUNTER — APPOINTMENT (OUTPATIENT)
Dept: OBGYN | Facility: CLINIC | Age: 38
End: 2023-02-01
Payer: COMMERCIAL

## 2023-02-01 VITALS
WEIGHT: 184 LBS | SYSTOLIC BLOOD PRESSURE: 133 MMHG | HEIGHT: 63 IN | DIASTOLIC BLOOD PRESSURE: 83 MMHG | BODY MASS INDEX: 32.6 KG/M2 | HEART RATE: 93 BPM | OXYGEN SATURATION: 98 %

## 2023-02-01 LAB
AFP MOM: 1.14
AFP VALUE: 55.6 NG/ML
ALPHA FETOPROTEIN COMMENTS: NORMAL
ALPHA FETOPROTEIN INTERPRETATION: NORMAL
ALPHA FETOPROTEIN TETRA RESULTS: NORMAL
ALPHA FETOPROTEIN TETRA TEST RESULTS: ABNORMAL
DIA MOM: 1.87
DIA VALUE: 293.55 PG/ML
DSR (BY AGE) 1 IN: 167
DSR (SECOND TRIMESTER) 1 IN: 244
GESTATIONAL AGE BASED ON: NORMAL
GESTATIONAL AGE ON COLLECTION DATE: 18.9 WEEKS
HCG MOM: 2.04
HCG VALUE: NORMAL MIU/ML
INSULIN DEP DIABETES: NO
MATERNAL AGE AT EDD AFP: 37.5 YR
MULTIPLE GESTATION: NO
OSBR RISK 1 IN: 7850
RACE: NORMAL
T18 (BY AGE): NORMAL
T18 RISK: NORMAL
UE3 MOM: 1.01
UE3 VALUE: 1.8 NG/ML
WEIGHT AFP: 163 LBS

## 2023-02-01 PROCEDURE — 0502F SUBSEQUENT PRENATAL CARE: CPT

## 2023-02-21 ENCOUNTER — APPOINTMENT (OUTPATIENT)
Dept: ANTEPARTUM | Facility: CLINIC | Age: 38
End: 2023-02-21
Payer: COMMERCIAL

## 2023-02-21 ENCOUNTER — ASOB RESULT (OUTPATIENT)
Age: 38
End: 2023-02-21

## 2023-02-21 PROCEDURE — 76816 OB US FOLLOW-UP PER FETUS: CPT

## 2023-02-21 PROCEDURE — 76819 FETAL BIOPHYS PROFIL W/O NST: CPT

## 2023-02-22 ENCOUNTER — APPOINTMENT (OUTPATIENT)
Dept: OBGYN | Facility: CLINIC | Age: 38
End: 2023-02-22
Payer: COMMERCIAL

## 2023-02-22 VITALS
BODY MASS INDEX: 31.63 KG/M2 | HEART RATE: 116 BPM | SYSTOLIC BLOOD PRESSURE: 108 MMHG | DIASTOLIC BLOOD PRESSURE: 70 MMHG | OXYGEN SATURATION: 98 % | WEIGHT: 178.57 LBS

## 2023-02-22 DIAGNOSIS — J06.9 ACUTE UPPER RESPIRATORY INFECTION, UNSPECIFIED: ICD-10-CM

## 2023-02-22 PROCEDURE — 0502F SUBSEQUENT PRENATAL CARE: CPT

## 2023-02-22 RX ORDER — AMOXICILLIN AND CLAVULANATE POTASSIUM 500; 125 MG/1; MG/1
500-125 TABLET, FILM COATED ORAL 3 TIMES DAILY
Qty: 21 | Refills: 0 | Status: ACTIVE | COMMUNITY
Start: 2023-02-22 | End: 1900-01-01

## 2023-02-27 ENCOUNTER — APPOINTMENT (OUTPATIENT)
Dept: ANTEPARTUM | Facility: CLINIC | Age: 38
End: 2023-02-27
Payer: COMMERCIAL

## 2023-02-27 ENCOUNTER — ASOB RESULT (OUTPATIENT)
Age: 38
End: 2023-02-27

## 2023-02-27 PROCEDURE — 76819 FETAL BIOPHYS PROFIL W/O NST: CPT

## 2023-03-03 ENCOUNTER — APPOINTMENT (OUTPATIENT)
Dept: OBGYN | Facility: CLINIC | Age: 38
End: 2023-03-03
Payer: COMMERCIAL

## 2023-03-03 VITALS
BODY MASS INDEX: 32.59 KG/M2 | SYSTOLIC BLOOD PRESSURE: 115 MMHG | WEIGHT: 184 LBS | OXYGEN SATURATION: 100 % | DIASTOLIC BLOOD PRESSURE: 80 MMHG | HEART RATE: 85 BPM

## 2023-03-03 DIAGNOSIS — Z34.90 ENCOUNTER FOR SUPERVISION OF NORMAL PREGNANCY, UNSPECIFIED, UNSPECIFIED TRIMESTER: ICD-10-CM

## 2023-03-03 PROCEDURE — 0502F SUBSEQUENT PRENATAL CARE: CPT

## 2023-03-06 ENCOUNTER — NON-APPOINTMENT (OUTPATIENT)
Age: 38
End: 2023-03-06

## 2023-03-06 ENCOUNTER — APPOINTMENT (OUTPATIENT)
Dept: ANTEPARTUM | Facility: CLINIC | Age: 38
End: 2023-03-06
Payer: COMMERCIAL

## 2023-03-06 ENCOUNTER — ASOB RESULT (OUTPATIENT)
Age: 38
End: 2023-03-06

## 2023-03-06 PROBLEM — Z34.90 INTRAUTERINE PREGNANCY: Status: ACTIVE | Noted: 2022-08-17

## 2023-03-06 PROCEDURE — 76819 FETAL BIOPHYS PROFIL W/O NST: CPT

## 2023-03-06 PROCEDURE — 76816 OB US FOLLOW-UP PER FETUS: CPT

## 2023-03-10 ENCOUNTER — NON-APPOINTMENT (OUTPATIENT)
Age: 38
End: 2023-03-10

## 2023-03-10 ENCOUNTER — APPOINTMENT (OUTPATIENT)
Dept: OBGYN | Facility: CLINIC | Age: 38
End: 2023-03-10
Payer: COMMERCIAL

## 2023-03-10 VITALS
DIASTOLIC BLOOD PRESSURE: 87 MMHG | SYSTOLIC BLOOD PRESSURE: 125 MMHG | BODY MASS INDEX: 32.24 KG/M2 | WEIGHT: 182 LBS | OXYGEN SATURATION: 97 % | HEART RATE: 94 BPM

## 2023-03-10 PROCEDURE — 0502F SUBSEQUENT PRENATAL CARE: CPT

## 2023-03-13 ENCOUNTER — OUTPATIENT (OUTPATIENT)
Dept: OUTPATIENT SERVICES | Facility: HOSPITAL | Age: 38
LOS: 1 days | End: 2023-03-13
Payer: COMMERCIAL

## 2023-03-13 DIAGNOSIS — Z01.818 ENCOUNTER FOR OTHER PREPROCEDURAL EXAMINATION: ICD-10-CM

## 2023-03-13 LAB
ALBUMIN SERPL ELPH-MCNC: 3.5 G/DL — SIGNIFICANT CHANGE UP (ref 3.3–5)
ALP SERPL-CCNC: 200 U/L — HIGH (ref 40–120)
ALT FLD-CCNC: 21 U/L — SIGNIFICANT CHANGE UP (ref 10–45)
ANION GAP SERPL CALC-SCNC: 9 MMOL/L — SIGNIFICANT CHANGE UP (ref 5–17)
APTT BLD: 26.1 SEC — LOW (ref 27.5–35.5)
AST SERPL-CCNC: 26 U/L — SIGNIFICANT CHANGE UP (ref 10–40)
B-HEM STREP SPEC QL CULT: NORMAL
BASOPHILS # BLD AUTO: 0.03 K/UL — SIGNIFICANT CHANGE UP (ref 0–0.2)
BASOPHILS NFR BLD AUTO: 0.4 % — SIGNIFICANT CHANGE UP (ref 0–2)
BILIRUB SERPL-MCNC: 0.3 MG/DL — SIGNIFICANT CHANGE UP (ref 0.2–1.2)
BLD GP AB SCN SERPL QL: NEGATIVE — SIGNIFICANT CHANGE UP
BUN SERPL-MCNC: 9 MG/DL — SIGNIFICANT CHANGE UP (ref 7–23)
CALCIUM SERPL-MCNC: 9.2 MG/DL — SIGNIFICANT CHANGE UP (ref 8.4–10.5)
CHLORIDE SERPL-SCNC: 103 MMOL/L — SIGNIFICANT CHANGE UP (ref 96–108)
CO2 SERPL-SCNC: 25 MMOL/L — SIGNIFICANT CHANGE UP (ref 22–31)
CREAT SERPL-MCNC: 0.85 MG/DL — SIGNIFICANT CHANGE UP (ref 0.5–1.3)
EGFR: 90 ML/MIN/1.73M2 — SIGNIFICANT CHANGE UP
EOSINOPHIL # BLD AUTO: 0.05 K/UL — SIGNIFICANT CHANGE UP (ref 0–0.5)
EOSINOPHIL NFR BLD AUTO: 0.6 % — SIGNIFICANT CHANGE UP (ref 0–6)
FIBRINOGEN PPP-MCNC: 378 MG/DL — SIGNIFICANT CHANGE UP (ref 258–438)
GLUCOSE SERPL-MCNC: 76 MG/DL — SIGNIFICANT CHANGE UP (ref 70–99)
HCT VFR BLD CALC: 41.3 % — SIGNIFICANT CHANGE UP (ref 34.5–45)
HGB BLD-MCNC: 13.8 G/DL — SIGNIFICANT CHANGE UP (ref 11.5–15.5)
HIV 1+2 AB+HIV1 P24 AG SERPL QL IA: SIGNIFICANT CHANGE UP
IMM GRANULOCYTES NFR BLD AUTO: 0.6 % — SIGNIFICANT CHANGE UP (ref 0–0.9)
INR BLD: 0.88 — SIGNIFICANT CHANGE UP (ref 0.88–1.16)
LYMPHOCYTES # BLD AUTO: 2.24 K/UL — SIGNIFICANT CHANGE UP (ref 1–3.3)
LYMPHOCYTES # BLD AUTO: 28.2 % — SIGNIFICANT CHANGE UP (ref 13–44)
MCHC RBC-ENTMCNC: 32.2 PG — SIGNIFICANT CHANGE UP (ref 27–34)
MCHC RBC-ENTMCNC: 33.4 GM/DL — SIGNIFICANT CHANGE UP (ref 32–36)
MCV RBC AUTO: 96.5 FL — SIGNIFICANT CHANGE UP (ref 80–100)
MONOCYTES # BLD AUTO: 0.62 K/UL — SIGNIFICANT CHANGE UP (ref 0–0.9)
MONOCYTES NFR BLD AUTO: 7.8 % — SIGNIFICANT CHANGE UP (ref 2–14)
NEUTROPHILS # BLD AUTO: 4.94 K/UL — SIGNIFICANT CHANGE UP (ref 1.8–7.4)
NEUTROPHILS NFR BLD AUTO: 62.4 % — SIGNIFICANT CHANGE UP (ref 43–77)
NRBC # BLD: 0 /100 WBCS — SIGNIFICANT CHANGE UP (ref 0–0)
PLATELET # BLD AUTO: 228 K/UL — SIGNIFICANT CHANGE UP (ref 150–400)
POTASSIUM SERPL-MCNC: 5.1 MMOL/L — SIGNIFICANT CHANGE UP (ref 3.5–5.3)
POTASSIUM SERPL-SCNC: 5.1 MMOL/L — SIGNIFICANT CHANGE UP (ref 3.5–5.3)
PROT SERPL-MCNC: 6.3 G/DL — SIGNIFICANT CHANGE UP (ref 6–8.3)
PROTHROM AB SERPL-ACNC: 10.5 SEC — SIGNIFICANT CHANGE UP (ref 10.5–13.4)
RBC # BLD: 4.28 M/UL — SIGNIFICANT CHANGE UP (ref 3.8–5.2)
RBC # FLD: 13.2 % — SIGNIFICANT CHANGE UP (ref 10.3–14.5)
RH IG SCN BLD-IMP: POSITIVE — SIGNIFICANT CHANGE UP
RH IG SCN BLD-IMP: POSITIVE — SIGNIFICANT CHANGE UP
SODIUM SERPL-SCNC: 137 MMOL/L — SIGNIFICANT CHANGE UP (ref 135–145)
WBC # BLD: 7.93 K/UL — SIGNIFICANT CHANGE UP (ref 3.8–10.5)
WBC # FLD AUTO: 7.93 K/UL — SIGNIFICANT CHANGE UP (ref 3.8–10.5)

## 2023-03-13 PROCEDURE — 85610 PROTHROMBIN TIME: CPT

## 2023-03-13 PROCEDURE — 86901 BLOOD TYPING SEROLOGIC RH(D): CPT

## 2023-03-13 PROCEDURE — 87902 NFCT AGT GNTYP ALYS HEP C: CPT

## 2023-03-13 PROCEDURE — 87389 HIV-1 AG W/HIV-1&-2 AB AG IA: CPT

## 2023-03-13 PROCEDURE — 85730 THROMBOPLASTIN TIME PARTIAL: CPT

## 2023-03-13 PROCEDURE — 85025 COMPLETE CBC W/AUTO DIFF WBC: CPT

## 2023-03-13 PROCEDURE — 85384 FIBRINOGEN ACTIVITY: CPT

## 2023-03-13 PROCEDURE — 86900 BLOOD TYPING SEROLOGIC ABO: CPT

## 2023-03-13 PROCEDURE — 86850 RBC ANTIBODY SCREEN: CPT

## 2023-03-13 PROCEDURE — 80053 COMPREHEN METABOLIC PANEL: CPT

## 2023-03-14 LAB — SARS-COV-2 N GENE NPH QL NAA+PROBE: NOT DETECTED

## 2023-03-15 ENCOUNTER — TRANSCRIPTION ENCOUNTER (OUTPATIENT)
Age: 38
End: 2023-03-15

## 2023-03-16 ENCOUNTER — RESULT REVIEW (OUTPATIENT)
Age: 38
End: 2023-03-16

## 2023-03-16 ENCOUNTER — INPATIENT (INPATIENT)
Facility: HOSPITAL | Age: 38
LOS: 1 days | Discharge: HOME | End: 2023-03-18
Attending: OBSTETRICS & GYNECOLOGY | Admitting: OBSTETRICS & GYNECOLOGY
Payer: COMMERCIAL

## 2023-03-16 VITALS
HEART RATE: 77 BPM | WEIGHT: 182.98 LBS | RESPIRATION RATE: 19 BRPM | HEIGHT: 63 IN | OXYGEN SATURATION: 100 % | SYSTOLIC BLOOD PRESSURE: 119 MMHG | DIASTOLIC BLOOD PRESSURE: 72 MMHG | TEMPERATURE: 97 F

## 2023-03-16 LAB
APTT BLD: 27.7 SEC — SIGNIFICANT CHANGE UP (ref 27.5–35.5)
BLD GP AB SCN SERPL QL: NEGATIVE — SIGNIFICANT CHANGE UP
COVID-19 SPIKE DOMAIN AB INTERP: POSITIVE
COVID-19 SPIKE DOMAIN ANTIBODY RESULT: >250 U/ML — HIGH
FIBRINOGEN PPP-MCNC: 306 MG/DL — SIGNIFICANT CHANGE UP (ref 258–438)
HCT VFR BLD CALC: 29 % — LOW (ref 34.5–45)
HCT VFR BLD CALC: 33.2 % — LOW (ref 34.5–45)
HGB BLD-MCNC: 11.2 G/DL — LOW (ref 11.5–15.5)
HGB BLD-MCNC: 9.9 G/DL — LOW (ref 11.5–15.5)
INR BLD: 0.96 — SIGNIFICANT CHANGE UP (ref 0.88–1.16)
MCHC RBC-ENTMCNC: 31.9 PG — SIGNIFICANT CHANGE UP (ref 27–34)
MCHC RBC-ENTMCNC: 32.1 PG — SIGNIFICANT CHANGE UP (ref 27–34)
MCHC RBC-ENTMCNC: 33.7 GM/DL — SIGNIFICANT CHANGE UP (ref 32–36)
MCHC RBC-ENTMCNC: 34.1 GM/DL — SIGNIFICANT CHANGE UP (ref 32–36)
MCV RBC AUTO: 93.5 FL — SIGNIFICANT CHANGE UP (ref 80–100)
MCV RBC AUTO: 95.1 FL — SIGNIFICANT CHANGE UP (ref 80–100)
NRBC # BLD: 0 /100 WBCS — SIGNIFICANT CHANGE UP (ref 0–0)
NRBC # BLD: 0 /100 WBCS — SIGNIFICANT CHANGE UP (ref 0–0)
PLATELET # BLD AUTO: 193 K/UL — SIGNIFICANT CHANGE UP (ref 150–400)
PLATELET # BLD AUTO: 210 K/UL — SIGNIFICANT CHANGE UP (ref 150–400)
PROTHROM AB SERPL-ACNC: 11.4 SEC — SIGNIFICANT CHANGE UP (ref 10.5–13.4)
RBC # BLD: 3.1 M/UL — LOW (ref 3.8–5.2)
RBC # BLD: 3.49 M/UL — LOW (ref 3.8–5.2)
RBC # FLD: 13 % — SIGNIFICANT CHANGE UP (ref 10.3–14.5)
RBC # FLD: 13 % — SIGNIFICANT CHANGE UP (ref 10.3–14.5)
RH IG SCN BLD-IMP: POSITIVE — SIGNIFICANT CHANGE UP
SARS-COV-2 IGG+IGM SERPL QL IA: >250 U/ML — HIGH
SARS-COV-2 IGG+IGM SERPL QL IA: POSITIVE
WBC # BLD: 15.07 K/UL — HIGH (ref 3.8–10.5)
WBC # BLD: 19.94 K/UL — HIGH (ref 3.8–10.5)
WBC # FLD AUTO: 15.07 K/UL — HIGH (ref 3.8–10.5)
WBC # FLD AUTO: 19.94 K/UL — HIGH (ref 3.8–10.5)

## 2023-03-16 PROCEDURE — 88307 TISSUE EXAM BY PATHOLOGIST: CPT | Mod: 26

## 2023-03-16 PROCEDURE — 88304 TISSUE EXAM BY PATHOLOGIST: CPT | Mod: 26

## 2023-03-16 DEVICE — INTERCEED 3 X 4": Type: IMPLANTABLE DEVICE | Status: FUNCTIONAL

## 2023-03-16 RX ORDER — SIMETHICONE 80 MG/1
80 TABLET, CHEWABLE ORAL EVERY 4 HOURS
Refills: 0 | Status: DISCONTINUED | OUTPATIENT
Start: 2023-03-16 | End: 2023-03-18

## 2023-03-16 RX ORDER — ACETAMINOPHEN 500 MG
1000 TABLET ORAL ONCE
Refills: 0 | Status: DISCONTINUED | OUTPATIENT
Start: 2023-03-16 | End: 2023-03-18

## 2023-03-16 RX ORDER — DIPHENHYDRAMINE HCL 50 MG
25 CAPSULE ORAL EVERY 6 HOURS
Refills: 0 | Status: DISCONTINUED | OUTPATIENT
Start: 2023-03-16 | End: 2023-03-18

## 2023-03-16 RX ORDER — SODIUM CHLORIDE 9 MG/ML
1000 INJECTION, SOLUTION INTRAVENOUS
Refills: 0 | Status: DISCONTINUED | OUTPATIENT
Start: 2023-03-16 | End: 2023-03-18

## 2023-03-16 RX ORDER — OXYTOCIN 10 UNIT/ML
333.33 VIAL (ML) INJECTION
Qty: 20 | Refills: 0 | Status: DISCONTINUED | OUTPATIENT
Start: 2023-03-16 | End: 2023-03-16

## 2023-03-16 RX ORDER — CEFAZOLIN SODIUM 1 G
2000 VIAL (EA) INJECTION ONCE
Refills: 0 | Status: COMPLETED | OUTPATIENT
Start: 2023-03-16 | End: 2023-03-16

## 2023-03-16 RX ORDER — FAMOTIDINE 10 MG/ML
20 INJECTION INTRAVENOUS ONCE
Refills: 0 | Status: COMPLETED | OUTPATIENT
Start: 2023-03-16 | End: 2023-03-16

## 2023-03-16 RX ORDER — MAGNESIUM HYDROXIDE 400 MG/1
30 TABLET, CHEWABLE ORAL
Refills: 0 | Status: DISCONTINUED | OUTPATIENT
Start: 2023-03-16 | End: 2023-03-18

## 2023-03-16 RX ORDER — TETANUS TOXOID, REDUCED DIPHTHERIA TOXOID AND ACELLULAR PERTUSSIS VACCINE, ADSORBED 5; 2.5; 8; 8; 2.5 [IU]/.5ML; [IU]/.5ML; UG/.5ML; UG/.5ML; UG/.5ML
0.5 SUSPENSION INTRAMUSCULAR ONCE
Refills: 0 | Status: DISCONTINUED | OUTPATIENT
Start: 2023-03-16 | End: 2023-03-18

## 2023-03-16 RX ORDER — ACETAMINOPHEN 500 MG
975 TABLET ORAL
Refills: 0 | Status: DISCONTINUED | OUTPATIENT
Start: 2023-03-16 | End: 2023-03-18

## 2023-03-16 RX ORDER — OXYTOCIN 10 UNIT/ML
333.33 VIAL (ML) INJECTION
Qty: 20 | Refills: 0 | Status: DISCONTINUED | OUTPATIENT
Start: 2023-03-16 | End: 2023-03-18

## 2023-03-16 RX ORDER — SODIUM CHLORIDE 9 MG/ML
1000 INJECTION, SOLUTION INTRAVENOUS
Refills: 0 | Status: DISCONTINUED | OUTPATIENT
Start: 2023-03-16 | End: 2023-03-16

## 2023-03-16 RX ORDER — OXYCODONE HYDROCHLORIDE 5 MG/1
5 TABLET ORAL ONCE
Refills: 0 | Status: DISCONTINUED | OUTPATIENT
Start: 2023-03-16 | End: 2023-03-18

## 2023-03-16 RX ORDER — CITRIC ACID/SODIUM CITRATE 300-500 MG
30 SOLUTION, ORAL ORAL ONCE
Refills: 0 | Status: COMPLETED | OUTPATIENT
Start: 2023-03-16 | End: 2023-03-16

## 2023-03-16 RX ORDER — SODIUM CHLORIDE 9 MG/ML
1000 INJECTION, SOLUTION INTRAVENOUS ONCE
Refills: 0 | Status: COMPLETED | OUTPATIENT
Start: 2023-03-16 | End: 2023-03-16

## 2023-03-16 RX ORDER — OXYCODONE HYDROCHLORIDE 5 MG/1
5 TABLET ORAL
Refills: 0 | Status: DISCONTINUED | OUTPATIENT
Start: 2023-03-16 | End: 2023-03-18

## 2023-03-16 RX ORDER — KETOROLAC TROMETHAMINE 30 MG/ML
30 SYRINGE (ML) INJECTION EVERY 6 HOURS
Refills: 0 | Status: DISCONTINUED | OUTPATIENT
Start: 2023-03-16 | End: 2023-03-17

## 2023-03-16 RX ORDER — LANOLIN
1 OINTMENT (GRAM) TOPICAL EVERY 6 HOURS
Refills: 0 | Status: DISCONTINUED | OUTPATIENT
Start: 2023-03-16 | End: 2023-03-18

## 2023-03-16 RX ORDER — IBUPROFEN 200 MG
600 TABLET ORAL EVERY 6 HOURS
Refills: 0 | Status: COMPLETED | OUTPATIENT
Start: 2023-03-16 | End: 2024-02-12

## 2023-03-16 RX ADMIN — Medication 100 MILLIGRAM(S): at 14:00

## 2023-03-16 RX ADMIN — Medication 975 MILLIGRAM(S): at 22:11

## 2023-03-16 RX ADMIN — Medication 30 MILLIGRAM(S): at 19:30

## 2023-03-16 RX ADMIN — Medication 1000 MILLIUNIT(S)/MIN: at 14:59

## 2023-03-16 RX ADMIN — SODIUM CHLORIDE 2000 MILLILITER(S): 9 INJECTION, SOLUTION INTRAVENOUS at 12:50

## 2023-03-16 RX ADMIN — Medication 30 MILLILITER(S): at 13:59

## 2023-03-16 RX ADMIN — SODIUM CHLORIDE 125 MILLILITER(S): 9 INJECTION, SOLUTION INTRAVENOUS at 13:20

## 2023-03-16 RX ADMIN — FAMOTIDINE 20 MILLIGRAM(S): 10 INJECTION INTRAVENOUS at 13:59

## 2023-03-16 NOTE — PRE-ANESTHESIA EVALUATION ADULT - NSANTHOSAYNRD_GEN_A_CORE
No. AJAY screening performed.  STOP BANG Legend: 0-2 = LOW Risk; 3-4 = INTERMEDIATE Risk; 5-8 = HIGH Risk

## 2023-03-17 ENCOUNTER — NON-APPOINTMENT (OUTPATIENT)
Age: 38
End: 2023-03-17

## 2023-03-17 LAB
BASOPHILS # BLD AUTO: 0.03 K/UL — SIGNIFICANT CHANGE UP (ref 0–0.2)
BASOPHILS NFR BLD AUTO: 0.2 % — SIGNIFICANT CHANGE UP (ref 0–2)
EOSINOPHIL # BLD AUTO: 0.01 K/UL — SIGNIFICANT CHANGE UP (ref 0–0.5)
EOSINOPHIL NFR BLD AUTO: 0.1 % — SIGNIFICANT CHANGE UP (ref 0–6)
HCT VFR BLD CALC: 23.8 % — LOW (ref 34.5–45)
HGB BLD-MCNC: 8.3 G/DL — LOW (ref 11.5–15.5)
IMM GRANULOCYTES NFR BLD AUTO: 0.6 % — SIGNIFICANT CHANGE UP (ref 0–0.9)
LYMPHOCYTES # BLD AUTO: 11.6 % — LOW (ref 13–44)
LYMPHOCYTES # BLD AUTO: 2.02 K/UL — SIGNIFICANT CHANGE UP (ref 1–3.3)
MCHC RBC-ENTMCNC: 32.9 PG — SIGNIFICANT CHANGE UP (ref 27–34)
MCHC RBC-ENTMCNC: 34.9 GM/DL — SIGNIFICANT CHANGE UP (ref 32–36)
MCV RBC AUTO: 94.4 FL — SIGNIFICANT CHANGE UP (ref 80–100)
MONOCYTES # BLD AUTO: 1.11 K/UL — HIGH (ref 0–0.9)
MONOCYTES NFR BLD AUTO: 6.4 % — SIGNIFICANT CHANGE UP (ref 2–14)
NEUTROPHILS # BLD AUTO: 14.07 K/UL — HIGH (ref 1.8–7.4)
NEUTROPHILS NFR BLD AUTO: 81.1 % — HIGH (ref 43–77)
NRBC # BLD: 0 /100 WBCS — SIGNIFICANT CHANGE UP (ref 0–0)
PLATELET # BLD AUTO: 172 K/UL — SIGNIFICANT CHANGE UP (ref 150–400)
RBC # BLD: 2.52 M/UL — LOW (ref 3.8–5.2)
RBC # FLD: 13 % — SIGNIFICANT CHANGE UP (ref 10.3–14.5)
T PALLIDUM AB TITR SER: NEGATIVE — SIGNIFICANT CHANGE UP
WBC # BLD: 17.34 K/UL — HIGH (ref 3.8–10.5)
WBC # FLD AUTO: 17.34 K/UL — HIGH (ref 3.8–10.5)

## 2023-03-17 RX ORDER — IRON SUCROSE 20 MG/ML
300 INJECTION, SOLUTION INTRAVENOUS EVERY 24 HOURS
Refills: 0 | Status: DISCONTINUED | OUTPATIENT
Start: 2023-03-17 | End: 2023-03-18

## 2023-03-17 RX ORDER — IBUPROFEN 200 MG
600 TABLET ORAL EVERY 6 HOURS
Refills: 0 | Status: DISCONTINUED | OUTPATIENT
Start: 2023-03-17 | End: 2023-03-18

## 2023-03-17 RX ORDER — ENOXAPARIN SODIUM 100 MG/ML
40 INJECTION SUBCUTANEOUS EVERY 24 HOURS
Refills: 0 | Status: DISCONTINUED | OUTPATIENT
Start: 2023-03-17 | End: 2023-03-18

## 2023-03-17 RX ADMIN — Medication 975 MILLIGRAM(S): at 21:08

## 2023-03-17 RX ADMIN — IRON SUCROSE 176.67 MILLIGRAM(S): 20 INJECTION, SOLUTION INTRAVENOUS at 19:44

## 2023-03-17 RX ADMIN — Medication 975 MILLIGRAM(S): at 21:28

## 2023-03-17 RX ADMIN — Medication 975 MILLIGRAM(S): at 10:00

## 2023-03-17 RX ADMIN — Medication 600 MILLIGRAM(S): at 23:38

## 2023-03-17 RX ADMIN — Medication 975 MILLIGRAM(S): at 16:14

## 2023-03-17 RX ADMIN — Medication 30 MILLIGRAM(S): at 07:16

## 2023-03-17 RX ADMIN — Medication 30 MILLIGRAM(S): at 01:12

## 2023-03-17 RX ADMIN — Medication 600 MILLIGRAM(S): at 23:40

## 2023-03-17 RX ADMIN — Medication 30 MILLIGRAM(S): at 12:06

## 2023-03-17 RX ADMIN — Medication 975 MILLIGRAM(S): at 03:44

## 2023-03-17 RX ADMIN — SODIUM CHLORIDE 125 MILLILITER(S): 9 INJECTION, SOLUTION INTRAVENOUS at 01:12

## 2023-03-17 RX ADMIN — ENOXAPARIN SODIUM 40 MILLIGRAM(S): 100 INJECTION SUBCUTANEOUS at 18:30

## 2023-03-17 NOTE — PROGRESS NOTE ADULT - ASSESSMENT
A/P   37y  s/p  section, POD #1 c/b PPH 2/2 focal accreta, stable  - PPH: pt is hemodynamically stable with normal vitals, no concerning bleeding at this time  -  Pain: PO motrin q6h, Tylenol q6h, oxycodone for severe pain PRN  -  Post-operatively labs: post-op Hgb  -  GI: tolerating clears, passing gas, ADAT  -  : s/p davenport, f/u TOV  -  DVT prophylaxis: ambulation, SCDs, Lovenox  -  Dispo: POD 3 or 4

## 2023-03-17 NOTE — PROGRESS NOTE ADULT - ATTENDING COMMENTS
Agree with the above, patient seen and examined by me.  POD#1, s/p ERCD c/b focal accretta and PPH, doing well today no complaints. AFVSS, HGB 8.3 and stable, no acute bleeding. Abdomen soft, ND, appropriately TTP. Bandage dry. Continue routine postop care. For lovenox and IV iron.    Katie Morrell MD

## 2023-03-17 NOTE — LACTATION INITIAL EVALUATION - LACTATION INTERVENTIONS
initiate/review safe skin-to-skin/initiate/review hand expression/initiate/review pumping guidelines and safe milk handling/initiate/review techniques for position and latch/review techniques to manage sore nipples/engorgement/reviewed components of an effective feeding and at least 8 effective feedings per day required/reviewed importance of monitoring infant diapers, the breastfeeding log, and minimum output each day/reviewed risks of artificial nipples/reviewed strategies to transition to breastfeeding only/reviewed benefits and recommendations for rooming in/reviewed feeding on demand/by cue at least 8 times a day

## 2023-03-17 NOTE — LACTATION INITIAL EVALUATION - NS LACT CON REASON FOR REQ
39 week baby boy seen at 20 hours of life. Voiding. Stooling. This is mother's second baby. Mother  her 5 yo exclusively for 4 months, and breast and bottle feeding for 1 year. Mother planned to bf this baby exclusively is opened to bottle supplementation if medically necessary or if she desire. At  arrival, baby was swaddled in crib. Mother has bilateral medium br. Medium everted nipples, skin dry and intact. HE education given with demonstration, + expressible small drops of colotrum noted. Baby is unswaddled and awake showing early signs of hunger. Mother was able to latch baby deeply with football hold to left breast with some assist. Lips widely flanged, + rhythmic sustained sucks noted. Mother taught techniques to properly aligned baby and to support breast and baby to facilitate deep latch. Mother stated she felt tugging and pulling, denied any pain or discomfort. Latched sustained with audible swallows on left breast for 1-2 minutes and fell asleep. Mother encouraged to give baby EBM if she has obtained any from HE. Complete bf education given. Frequent/ responsive feeding and SSC encouraged. Mother verbalizes understanding of the above. All questions answered. Plan as above communicated to primary RN./multiparous mom

## 2023-03-17 NOTE — PROGRESS NOTE ADULT - SUBJECTIVE AND OBJECTIVE BOX
Patient evaluated at bedside this morning, resting comfortable in bed, with no acute events overnight.  She reports pain is well controlled.  She denies headache, dizziness, chest pain, palpitations, shortness of breathe, nausea, vomiting or heavy vaginal bleeding.  She has not tried ambulating since procedure, davenport was removed at this time. Tolerating clear liquids.     Physical Exam:  Vital Signs Last 24 Hrs  T(C): 36.3 (17 Mar 2023 06:14), Max: 37 (16 Mar 2023 18:49)  T(F): 97.3 (17 Mar 2023 06:14), Max: 98.6 (16 Mar 2023 18:49)  HR: 81 (17 Mar 2023 06:14) (68 - 98)  BP: 116/69 (17 Mar 2023 06:14) (92/49 - 129/85)  BP(mean): 91 (16 Mar 2023 17:32) (86 - 93)  RR: 18 (17 Mar 2023 06:14) (16 - 19)  SpO2: 98% (17 Mar 2023 06:14) (97% - 100%)    Parameters below as of 17 Mar 2023 06:14  Patient On (Oxygen Delivery Method): room air        GA: NAD, A+0 x 3  Abd:  soft, nontender, nondistended, no rebound or guarding, incision clean, dry and intact, uterus firm at midline, fb below umbilicus  :  lochia WNL  Extremities: no swelling or calf tenderness, reflexes +2 bilaterally.                            8.3    17.34 )-----------( 172      ( 17 Mar 2023 05:31 )             23.8             PT/INR - ( 16 Mar 2023 17:10 )   PT: 11.4 sec;   INR: 0.96          PTT - ( 16 Mar 2023 17:10 )  PTT:27.7 sec  acetaminophen     Tablet .. 975 milliGRAM(s) Oral <User Schedule>  acetaminophen   IVPB .. 1000 milliGRAM(s) IV Intermittent once  diphenhydrAMINE 25 milliGRAM(s) Oral every 6 hours PRN  diphtheria/tetanus/pertussis (acellular) Vaccine (Adacel) 0.5 milliLiter(s) IntraMuscular once  ibuprofen  Tablet. 600 milliGRAM(s) Oral every 6 hours  ketorolac   Injectable 30 milliGRAM(s) IV Push every 6 hours  lactated ringers. 1000 milliLiter(s) IV Continuous <Continuous>  lanolin Ointment 1 Application(s) Topical every 6 hours PRN  magnesium hydroxide Suspension 30 milliLiter(s) Oral two times a day PRN  oxyCODONE    IR 5 milliGRAM(s) Oral every 3 hours PRN  oxyCODONE    IR 5 milliGRAM(s) Oral once PRN  oxytocin Infusion 333.333 milliUNIT(s)/Min IV Continuous <Continuous>  simethicone 80 milliGRAM(s) Chew every 4 hours PRN

## 2023-03-18 ENCOUNTER — TRANSCRIPTION ENCOUNTER (OUTPATIENT)
Age: 38
End: 2023-03-18

## 2023-03-18 VITALS
DIASTOLIC BLOOD PRESSURE: 78 MMHG | TEMPERATURE: 98 F | SYSTOLIC BLOOD PRESSURE: 125 MMHG | OXYGEN SATURATION: 98 % | RESPIRATION RATE: 17 BRPM | HEART RATE: 113 BPM

## 2023-03-18 PROCEDURE — C1765: CPT

## 2023-03-18 PROCEDURE — 59050 FETAL MONITOR W/REPORT: CPT

## 2023-03-18 PROCEDURE — 85730 THROMBOPLASTIN TIME PARTIAL: CPT

## 2023-03-18 PROCEDURE — 85384 FIBRINOGEN ACTIVITY: CPT

## 2023-03-18 PROCEDURE — 59510 CESAREAN DELIVERY: CPT

## 2023-03-18 PROCEDURE — 85025 COMPLETE CBC W/AUTO DIFF WBC: CPT

## 2023-03-18 PROCEDURE — 85610 PROTHROMBIN TIME: CPT

## 2023-03-18 PROCEDURE — 86901 BLOOD TYPING SEROLOGIC RH(D): CPT

## 2023-03-18 PROCEDURE — 86780 TREPONEMA PALLIDUM: CPT

## 2023-03-18 PROCEDURE — 85027 COMPLETE CBC AUTOMATED: CPT

## 2023-03-18 PROCEDURE — 86900 BLOOD TYPING SEROLOGIC ABO: CPT

## 2023-03-18 PROCEDURE — 86769 SARS-COV-2 COVID-19 ANTIBODY: CPT

## 2023-03-18 PROCEDURE — 86850 RBC ANTIBODY SCREEN: CPT

## 2023-03-18 PROCEDURE — 36415 COLL VENOUS BLD VENIPUNCTURE: CPT

## 2023-03-18 PROCEDURE — 88304 TISSUE EXAM BY PATHOLOGIST: CPT

## 2023-03-18 PROCEDURE — 88307 TISSUE EXAM BY PATHOLOGIST: CPT

## 2023-03-18 RX ORDER — ACETAMINOPHEN 500 MG
3 TABLET ORAL
Qty: 0 | Refills: 0 | DISCHARGE
Start: 2023-03-18

## 2023-03-18 RX ORDER — IBUPROFEN 200 MG
1 TABLET ORAL
Qty: 0 | Refills: 0 | DISCHARGE
Start: 2023-03-18

## 2023-03-18 RX ORDER — ASPIRIN/CALCIUM CARB/MAGNESIUM 324 MG
1 TABLET ORAL
Qty: 0 | Refills: 0 | DISCHARGE

## 2023-03-18 RX ADMIN — Medication 975 MILLIGRAM(S): at 08:53

## 2023-03-18 RX ADMIN — IRON SUCROSE 176.67 MILLIGRAM(S): 20 INJECTION, SOLUTION INTRAVENOUS at 13:42

## 2023-03-18 RX ADMIN — Medication 975 MILLIGRAM(S): at 14:33

## 2023-03-18 RX ADMIN — Medication 975 MILLIGRAM(S): at 02:53

## 2023-03-18 RX ADMIN — Medication 600 MILLIGRAM(S): at 05:59

## 2023-03-18 RX ADMIN — Medication 600 MILLIGRAM(S): at 11:15

## 2023-03-18 NOTE — DISCHARGE NOTE OB - MEDICATION SUMMARY - MEDICATIONS TO TAKE
I will START or STAY ON the medications listed below when I get home from the hospital:    acetaminophen 325 mg oral tablet  -- 3 tab(s) by mouth every 6 hours  -- Indication: For pain    ibuprofen 600 mg oral tablet  -- 1 tab(s) by mouth every 6 hours  -- Indication: For pain    PNV Prenatal oral tablet  -- 1 tab(s) by mouth once a day  -- Indication: For postpartum

## 2023-03-18 NOTE — DISCHARGE NOTE OB - NS MD DC FALL RISK RISK
For information on Fall & Injury Prevention, visit: [email or web address] OR  [email or web address] OR  https://www.cdc.gov/steadi/patient.html

## 2023-03-18 NOTE — DISCHARGE NOTE OB - HOSPITAL COURSE
37y female s/p  section, post operative day 2, meeting all postpartum milestones. Her surgery was complicated by postpartum hemorrhage due to focal accreta. She was asymptomatic postpartum and was given IV iron. Vitals are stable for discharge. No heavy lifting. Pelvic rest until cleared. Follow-up with obstetrician in 1-2 weeks.

## 2023-03-18 NOTE — PROGRESS NOTE ADULT - ASSESSMENT
A/P: 37y s/p  section, POD#2, c/b postpartum hemorrhage stable  - PPH: patient stable, asymptomatic, s/p IV iron  -  Pain: PO motrin q6hrs, tylenol q8hrs, oxycodone for severe pain PRN  -  Post-operatively labs: post-op Hgb 8.3, hemodynamically stable, no symptoms of anemia   -  GI: tolerating regular diet, passing gas  -  : s/p davenport , urinating without difficulty  -  DVT prophylaxis: encouraged increased ambulation, SCDs, SQL  -  Dispo: POD 3 or 4

## 2023-03-18 NOTE — PROGRESS NOTE ADULT - SUBJECTIVE AND OBJECTIVE BOX
Patient evaluated at bedside.   No complaints.  Mrs. Burnett requests to go home today.  She reports pain is well controlled with oral NSAID's.   She denies headache, dizziness, chest pain, palpitations, shortness of breath, nausea, vomiting or heavy vaginal bleeding.  She has been ambulating without assistance, voiding spontaneously, passing gas, tolerating regular diet and is breastfeeding.    PHYSICAL EXAM:    GA: NAD, A+0 x 3  CV: RRR  Pulm: CTA BL  Breasts: soft, nontender, no palpable masses  Abd: ( + ) BS, soft, nontender, nondistended, no rebound or guarding,   Incision: clean, dry and intact; steri-strips in place  Uterus: Fundus midline; firm at 4 fb below umbilicus  : lochia WNL  Extremities: no swelling or calf tenderness, reflexes +2 bilaterally.  Lind's sign negative BL                            8.3    17.34 )-----------( 172      ( 17 Mar 2023 05:31 )             23.8             PT/INR - ( 16 Mar 2023 17:10 )   PT: 11.4 sec;   INR: 0.96          PTT - ( 16 Mar 2023 17:10 )  PTT:27.7 sec    Assessment:    POD # 2 s/p  delivery complicated by intrapartum hemorrhage  stable    Plan:    Diet: continue regular diet as tolerated  Labs: appropriate drop in hgb which is now stable.  Pain meds: tolerating pain well with oral NSAID's  Activity: OOB ad amauri  Disposition: Stable for discharge home today    Lynn Luna MD FACOG

## 2023-03-18 NOTE — DISCHARGE NOTE OB - PATIENT PORTAL LINK FT
You can access the FollowMyHealth Patient Portal offered by [facility] by registering at the following website: [email or web address]. By joining [facility]’s FollowMyHealth portal, you will also be able to view your health information using other applications (apps) compatible with our system.

## 2023-03-18 NOTE — PROGRESS NOTE ADULT - SUBJECTIVE AND OBJECTIVE BOX
Patient evaluated at bedside this morning, resting comfortable in bed.   She reports pain is well controlled with oral pain medications.   She denies headache, dizziness, chest pain, palpitations, shortness of breath, nausea, vomiting or heavy vaginal bleeding.  She has been ambulating without assistance, voiding spontaneously, passing gas, tolerating regular diet.     Physical Exam:  Vital Signs Last 24 Hrs  T(C): 37.1 (18 Mar 2023 06:00), Max: 37.2 (17 Mar 2023 14:00)  T(F): 98.7 (18 Mar 2023 06:00), Max: 98.9 (17 Mar 2023 14:00)  HR: 92 (18 Mar 2023 06:00) (79 - 92)  BP: 119/82 (18 Mar 2023 06:00) (109/75 - 123/82)  BP(mean): 87 (17 Mar 2023 14:00) (87 - 90)  RR: 16 (18 Mar 2023 06:00) (16 - 18)  SpO2: 98% (18 Mar 2023 06:00) (97% - 98%)    Parameters below as of 18 Mar 2023 06:00  Patient On (Oxygen Delivery Method): room air        GA: NAD, A+0 x 3  Pulm: no increased WOB  Abd: soft, nontender, nondistended, no rebound or guarding, incision clean, dry and intact, uterus firm at midline, 2  fb below umbilicus  Extremities: no swelling or calf tenderness                             8.3    17.34 )-----------( 172      ( 17 Mar 2023 05:31 )             23.8             PT/INR - ( 16 Mar 2023 17:10 )   PT: 11.4 sec;   INR: 0.96          PTT - ( 16 Mar 2023 17:10 )  PTT:27.7 sec

## 2023-03-18 NOTE — DISCHARGE NOTE OB - CARE PROVIDER_API CALL
Lynn Luna)  Obstetrics and Gynecology  [address] [place] [address]  Phone: [phone]  Fax: [phone]  Follow Up Time:

## 2023-03-22 DIAGNOSIS — Z3A.39 39 WEEKS GESTATION OF PREGNANCY: ICD-10-CM

## 2023-03-22 DIAGNOSIS — O34.211 MATERNAL CARE FOR LOW TRANSVERSE SCAR FROM PREVIOUS CESAREAN DELIVERY: ICD-10-CM

## 2023-03-22 DIAGNOSIS — Z30.2 ENCOUNTER FOR STERILIZATION: ICD-10-CM

## 2023-03-22 DIAGNOSIS — O43.213 PLACENTA ACCRETA, THIRD TRIMESTER: ICD-10-CM

## 2023-03-22 DIAGNOSIS — Z28.09 IMMUNIZATION NOT CARRIED OUT BECAUSE OF OTHER CONTRAINDICATION: ICD-10-CM

## 2023-03-23 LAB
GRAZOPREVIR RESISTANCE: SIGNIFICANT CHANGE UP
HCV NS3 MUT DET ISLT GENOTYP: SIGNIFICANT CHANGE UP
PARITAPREVIR RESISTANCE: SIGNIFICANT CHANGE UP
SIMPREVIR RESISTANCE: SIGNIFICANT CHANGE UP

## 2023-03-31 ENCOUNTER — APPOINTMENT (OUTPATIENT)
Dept: OBGYN | Facility: CLINIC | Age: 38
End: 2023-03-31
Payer: COMMERCIAL

## 2023-03-31 VITALS
OXYGEN SATURATION: 100 % | DIASTOLIC BLOOD PRESSURE: 86 MMHG | SYSTOLIC BLOOD PRESSURE: 120 MMHG | WEIGHT: 162 LBS | BODY MASS INDEX: 28.7 KG/M2 | HEART RATE: 66 BPM

## 2023-03-31 DIAGNOSIS — Z48.89 ENCOUNTER FOR OTHER SPECIFIED SURGICAL AFTERCARE: ICD-10-CM

## 2023-03-31 PROCEDURE — 0503F POSTPARTUM CARE VISIT: CPT

## 2023-04-06 ENCOUNTER — NON-APPOINTMENT (OUTPATIENT)
Age: 38
End: 2023-04-06

## 2023-04-06 LAB — SURGICAL PATHOLOGY STUDY: SIGNIFICANT CHANGE UP

## 2023-04-11 ENCOUNTER — APPOINTMENT (OUTPATIENT)
Dept: ORTHOPEDIC SURGERY | Facility: CLINIC | Age: 38
End: 2023-04-11
Payer: COMMERCIAL

## 2023-04-11 VITALS — RESPIRATION RATE: 16 BRPM | WEIGHT: 165 LBS | HEIGHT: 63 IN | BODY MASS INDEX: 29.23 KG/M2

## 2023-04-11 DIAGNOSIS — G56.03 CARPAL TUNNEL SYNDROM,BILATERAL UPPER LIMBS: ICD-10-CM

## 2023-04-11 PROCEDURE — 20526 THER INJECTION CARP TUNNEL: CPT | Mod: RT

## 2023-04-11 PROCEDURE — 73110 X-RAY EXAM OF WRIST: CPT | Mod: 50

## 2023-04-11 PROCEDURE — 99203 OFFICE O/P NEW LOW 30 MIN: CPT | Mod: 25

## 2023-04-19 ENCOUNTER — NON-APPOINTMENT (OUTPATIENT)
Age: 38
End: 2023-04-19

## 2023-04-26 ENCOUNTER — APPOINTMENT (OUTPATIENT)
Dept: OBGYN | Facility: CLINIC | Age: 38
End: 2023-04-26
Payer: COMMERCIAL

## 2023-04-26 VITALS
WEIGHT: 156 LBS | SYSTOLIC BLOOD PRESSURE: 125 MMHG | BODY MASS INDEX: 27.63 KG/M2 | HEART RATE: 95 BPM | OXYGEN SATURATION: 99 % | DIASTOLIC BLOOD PRESSURE: 83 MMHG

## 2023-04-26 PROCEDURE — 0503F POSTPARTUM CARE VISIT: CPT

## 2023-05-02 NOTE — HISTORY OF PRESENT ILLNESS
[Postpartum Follow Up] : postpartum follow up [Complications:___] : complications include: [unfilled] [Delivery Date: ___] : on [unfilled] [Female] : Delivery History: baby girl [Breastfeeding] : currently nursing [Repeat C/S] : delivered by  section (repeat) [Resumed Menses] : has not resumed her menses [Resumed Flasher] : has not resumed intercourse [Intended Contraception] : the patient does not intended to use contraception postpartum [S/Sx PP Depression] : no signs/symptoms of postpartum depression [Clean/Dry/Intact] : clean, dry and intact [Erythema] : not erythematous [Swelling] : not swollen [Dehiscence] : not dehisced [Healed] : healed [Back to Normal] : is back to normal in size [Cervix Sample Taken] : cervical sample not taken for a Pap smear [Not Done] : Examination of breasts not done [Awake] : awake [Alert] : alert [Acute Distress] : no acute distress [Soft] : soft [Tender] : non tender [Distended] : not distended [H/Smegaly] : no hepatosplenomegaly [Oriented x3] : oriented to person, place, and time [Depressed Mood] : not depressed [Flat Affect] : affect not flat [No Lesions] : no genitalia lesions [Labia Majora] : labia major [Labia Minora] : labia minora [Normal] : clitoris [No Bleeding] : there was no active vaginal bleeding [Tenderness] : nontender [Enlarged ___ wks] : not enlarged [No Pitting Edema] : no pitting edema present [Doing Well] : is doing well [No Sign of Infection] : is showing no signs of infection [Excellent Pain Control] : has excellent pain control [None] : None [FreeTextEntry8] : six weeks postpartum visit follow up.  COntinues to c/o numbness and tingling in her hands. Reports that she has already self referred to hand surgery.   [de-identified] : numbness in left hand  [de-identified] : stable s/p repeat  delivery, carpal tunnel syndrome. [de-identified] : Has follow up scheduled with the hand specialists.  RTO 1 yr or PRN

## 2023-05-02 NOTE — HISTORY OF PRESENT ILLNESS
[Postpartum Consultation] : postpartum consultation [Delivery Date: ___] : on [unfilled] [Repeat C/S] : delivered by  section (repeat) [Female] : Delivery History: baby girl [Breastfeeding] : currently nursing [None] : No associated symptoms are reported [Clean/Dry/Intact] : clean, dry and intact [Erythema] : not erythematous [Swelling] : not swollen [Dehiscence] : not dehisced [Back to Normal] : is back to normal in size [Mild] : mild vaginal bleeding [Not Done] : Examination of breasts not done [Doing Well] : is doing well [No Sign of Infection] : is showing no signs of infection [Excellent Pain Control] : has excellent pain control [No [place]] : to avoid sexual intercourse [Unlimited ADLs] : to participate in activities of daily living without limitations as pain allows [No Work] : not to work [Limited Housework] : to do housework with limitations [No Sports] : not to participate in sports [FreeTextEntry8] : two weeks postpartum visit.  C/O numbness and tingling in her hands and fingers bilaterally. Mrs. Burnett reports a hx of carpal tunnel syndrome that was treated with steroid injections during her prior pregnancy.   [de-identified] : stable s/p repeat  delivery.  Carpal tunnel syndrome.  [de-identified] : Will RTO 4 wks and if carpal tunnel symptoms haven't improved, will refer to a hand surgeon.

## 2023-05-05 ENCOUNTER — APPOINTMENT (OUTPATIENT)
Dept: PHYSICAL MEDICINE AND REHAB | Facility: CLINIC | Age: 38
End: 2023-05-05

## 2023-08-21 ENCOUNTER — NON-APPOINTMENT (OUTPATIENT)
Age: 38
End: 2023-08-21

## 2023-08-29 ENCOUNTER — NON-APPOINTMENT (OUTPATIENT)
Age: 38
End: 2023-08-29

## 2023-08-30 ENCOUNTER — APPOINTMENT (OUTPATIENT)
Dept: OBGYN | Facility: CLINIC | Age: 38
End: 2023-08-30
Payer: COMMERCIAL

## 2023-08-30 VITALS
HEART RATE: 68 BPM | WEIGHT: 156 LBS | BODY MASS INDEX: 27.63 KG/M2 | DIASTOLIC BLOOD PRESSURE: 86 MMHG | OXYGEN SATURATION: 100 % | SYSTOLIC BLOOD PRESSURE: 122 MMHG

## 2023-08-30 DIAGNOSIS — Z01.419 ENCOUNTER FOR GYNECOLOGICAL EXAMINATION (GENERAL) (ROUTINE) W/OUT ABNORMAL FINDINGS: ICD-10-CM

## 2023-08-30 PROCEDURE — 99395 PREV VISIT EST AGE 18-39: CPT

## 2023-08-31 ENCOUNTER — NON-APPOINTMENT (OUTPATIENT)
Age: 38
End: 2023-08-31

## 2023-08-31 PROBLEM — Z01.419 ENCOUNTER FOR ANNUAL ROUTINE GYNECOLOGICAL EXAMINATION: Status: ACTIVE | Noted: 2021-11-17

## 2023-08-31 LAB — HPV HIGH+LOW RISK DNA PNL CVX: NOT DETECTED

## 2023-08-31 NOTE — HISTORY OF PRESENT ILLNESS
[Currently Active] : currently active [No] : No [FreeTextEntry1] : 8/30/23 [FreeTextEntry4] : Denies hx of sexual abuse

## 2023-08-31 NOTE — PHYSICAL EXAM
[Chaperone Present] : A chaperone was present in the examining room during all aspects of the physical examination [Appropriately responsive] : appropriately responsive [Alert] : alert [Soft] : soft [Non-tender] : non-tender [Non-distended] : non-distended [No Lesions] : no lesions [No Mass] : no mass [Oriented x3] : oriented x3 [Labia Majora] : normal [Labia Minora] : normal [Moderate] : There was moderate vaginal bleeding [Normal] : normal [Uterine Adnexae] : normal [Tenderness] : nontender [Enlarged ___ wks] : not enlarged

## 2023-09-03 ENCOUNTER — NON-APPOINTMENT (OUTPATIENT)
Age: 38
End: 2023-09-03

## 2023-09-03 ENCOUNTER — TRANSCRIPTION ENCOUNTER (OUTPATIENT)
Age: 38
End: 2023-09-03

## 2023-09-03 LAB — CYTOLOGY CVX/VAG DOC THIN PREP: NORMAL

## 2023-12-21 NOTE — DISCHARGE NOTE OB - CARE PROVIDERS DIRECT ADDRESSES
[email or web address] [phone]

## 2024-04-17 NOTE — PROGRESS NOTE ADULT - PROVIDER SPECIALTY LIST ADULT
OB Pt received from RN @ shift change. Pt remains resting in bed. Pt on monitor - sinus tach. IV sites assessed - dry intact transparent, flushing well, good blood return, no abnormalities noted. No additional CO pain or discomfort @ this time. A&Ox4, HEENT clear, LS equal clear bilat, denies SOB, pt on room air, denies CP, abd SNT. Safety maintained. Pt pending SICU bed.

## 2024-05-10 ENCOUNTER — EMERGENCY (EMERGENCY)
Facility: HOSPITAL | Age: 39
LOS: 1 days | Discharge: HOME | End: 2024-05-10
Admitting: EMERGENCY MEDICINE
Payer: COMMERCIAL

## 2024-05-10 VITALS
HEART RATE: 76 BPM | SYSTOLIC BLOOD PRESSURE: 124 MMHG | RESPIRATION RATE: 16 BRPM | HEIGHT: 63 IN | TEMPERATURE: 98 F | OXYGEN SATURATION: 99 % | WEIGHT: 149.91 LBS | DIASTOLIC BLOOD PRESSURE: 87 MMHG

## 2024-05-10 PROCEDURE — 96372 THER/PROPH/DIAG INJ SC/IM: CPT

## 2024-05-10 PROCEDURE — 99283 EMERGENCY DEPT VISIT LOW MDM: CPT

## 2024-05-10 PROCEDURE — 99283 EMERGENCY DEPT VISIT LOW MDM: CPT | Mod: 25

## 2024-05-10 RX ORDER — METHOCARBAMOL 500 MG/1
2 TABLET, FILM COATED ORAL
Qty: 24 | Refills: 0
Start: 2024-05-10 | End: 2024-05-12

## 2024-05-10 RX ORDER — KETOROLAC TROMETHAMINE 30 MG/ML
15 SYRINGE (ML) INJECTION ONCE
Refills: 0 | Status: DISCONTINUED | OUTPATIENT
Start: 2024-05-10 | End: 2024-05-10

## 2024-05-10 RX ADMIN — Medication 15 MILLIGRAM(S): at 15:09

## 2024-05-10 NOTE — ED PROVIDER NOTE - NS ED ROS FT
CONSTITUTIONAL: Denies fever and chills    RESPIRATORY: Denies SOB     CARDIOVASCULAR: Denies chest pain.    GASTROINTESTINAL: Denies abdominal pain, nausea, vomiting and diarrhea.    GENITOURINARY: Denies dysuria and hematuria.    MUSCULOSKELETAL: See HPI    NEUROLOGICAL: See HPI

## 2024-05-10 NOTE — ED ADULT NURSE NOTE - CAS DISCH BELONGINGS RETURNED
#[phone]  Pt states he has been calling you? Pt states he was in the ED last night, 4-23-20 and would like a call today from the nurse. Yes

## 2024-05-10 NOTE — ED PROVIDER NOTE - PATIENT PORTAL LINK FT
You can access the FollowMyHealth Patient Portal offered by [facility] by registering at the following website: [email or web address]. By joining [facility]’s FollowMyHealth portal, you will also be able to view your health information using other applications (apps) compatible with our system.

## 2024-05-10 NOTE — ED ADULT NURSE NOTE - OBJECTIVE STATEMENT
38y F presents to ED c/o L anterior thigh pain/tingling. Endorses pain/tingling sensation to L anterior thigh, sometimes radiating to L buttock, worse on movement. Denies other numbness/tingling, focal weakness, difficulty ambulating, fall/trauma/injury, bowel/bladder dysfunction. Pt AAOx4, speaking in full and clear sentences, NAD at this time. Ambulatory with steady gait on arrival.

## 2024-05-10 NOTE — ED PROVIDER NOTE - OBJECTIVE STATEMENT
37 y/o female w/ no sig pmh p/w L anteromedial thigh pain that now occ radiates to L groin and L lower back x 2 days.  Described as 'tingling' and 'weird' and like 'sandpaper,' worse with movement.  Denies swelling to leg.  Denies hx similar.  Started approx 3 hours after doing peloton workout.  Denies direct trauma.  Denies f/c, abd pain, n/v/d, dysuria, changes in urination/ bowel habits, saddle anesthesia, numbness or weakness to ext.  Denies recent travel, ocp use, or hx dvt/pe.  Tried 1 dose advil last night with slight improvement.  Denies possibility of pregnancy.

## 2024-05-10 NOTE — ED PROVIDER NOTE - CLINICAL SUMMARY MEDICAL DECISION MAKING FREE TEXT BOX
37 y/o female w/ no sig pmh p/w L anteromedial thigh pain that now occ radiates to L groin and L lower back x 2 days.  Described as 'tingling' and 'weird' and like 'sandpaper,' worse with movement.  Denies swelling to leg.  Denies hx similar.  Started approx 3 hours after doing peloton workout.  Denies direct trauma.  Denies f/c, abd pain, n/v/d, dysuria, changes in urination/ bowel habits, saddle anesthesia, numbness or weakness to ext.  Denies recent travel, ocp use, or hx dvt/pe.  Tried 1 dose advil last night with slight improvement.  Denies possibility of pregnancy.  VSS  Exam grossly unrevealing.  No redness.  No focal ttp  No red flags to suggest acute cord compression, cauda equina, fx, or spinal/epidual abscess  Wells score 0, doubt DVT  Overall suspect likely msk etiology vs sciatica/ radiculopathy.  Pain almost seems to be in L2 dermatome distribution, so d/w pt possibility of early shingles as well (prior to rash), but lower on differential  D/w pt options, offered us and xr leg vs just trial pain meds with watch/ wait.  pt prefers pain meds with watch/wait.  Will dc with pain meds/ muscle relaxants.  Strict return precautions provided.  Will DC

## 2024-05-10 NOTE — ED PROVIDER NOTE - PHYSICAL EXAMINATION
CONSTITUTIONAL: Awake, alert.  Nontoxic, no acute distress.    HEAD: Normocephalic, atraumatic.    EYES: Conjunctivae clear without exudates or hemorrhage. Sclera is non-icteric.    ENT: Normal appearing external ears, nose, mucous membranes moist.    NECK: supple, trachea midline.    HEART:  Normal rate    LUNGS:  No acute respiratory distress.  Non-tachypneic and non-labored.      ABDOMEN: Normal appearing skin without lesions, rashes.  Normal bowel sounds x 4.  Soft, non-distended, non-tender in all four quadrants. No rebound or guarding. No hernias or masses palpable.  No pulsatile abdominal mass.   No CVA tenderness b/l.    BACK: No obvious deformity.  No midline or paraspinal ttp.      MUSCULOSKELETAL:  Normal appearing extremities without obvious deformity, rash, ecchymosis, erythema.  No swelling.  Warm. No focal tenderness.  FROM b/l upper and lower extremities.  5/5 strength b/l upper and lower ext.  Sensation and motor function grossly intact.  Strong equal peripheral pulses b/l.   Cap refill < 2 b/l upper and lower ext.  All compartments soft.    SKIN: Skin in warm, dry and intact without rashes or lesions.  Appropriate color for ethnicity.    NEUROLOGICAL:  Patient is alert, oriented x person, place and time.    PSYCH: Appropriate mood and affect. Good judgment and insight.

## 2024-05-10 NOTE — ED PROVIDER NOTE - NSFOLLOWUPINSTRUCTIONS_ED_ALL_ED_FT
Thank you for visiting [facility] Emergency Department.      We saw you today for leg pain.  I suspect this is likely from a nerve or muscle.  If you develop a rash, then return to ER as this could represent an infection or shingles.    PAIN CONTROL:   You may take ibuprofen (Motrin, Advil) 600 mg (3 regular tablets) every 6 hours as needed for pain.  Please take with food.  Stop taking if you develop abdominal pain, dark/ bloody stools.  Do not mix with other NSAIDS (ie. Naproxen, Aleve, Celecoxib).  You may also take acetaminophen (Tylenol) 650-975mg (2-3 regular tablets) or 500-1000mg (1-2 extra strength tablets) every 6 hours as needed for pain.  Do not exceed 4000 mg in 1 day. These medications may be bought over the counter.    I recommend alternating the Ibuprofen and Tylenol so you are getting medications around the clock.  For example take the Ibuprofen, then 3 hours later take the Tylenol, then 3 hours later take the Ibuprofen, and repeat as needed.    You may take Robaxin as prescribed and only as needed for severe pain.  Do not drive or operate machinery while taking this medication.  This medication may make you feel sleepy and has the potential to be habit-forming or addictive.    Rest. Apply ice to affected area 20 minutes on, then 20 minutes off.  You may repeat throughout the day.  Please wear ACE wrap as instructed. Elevate affected extremity.    Please know that no emergency visit is complete without follow-up with your primary care provider in 1 week.  Please bring copies of all discharge papers and results and show to your doctor.      Please continue taking all previous medications as instructed unless we discussed otherwise.     I appreciated your patience and hope you feel better soon.     Return to ER immediately if you develop fevers, chills, chest pain, shortness of breath, rash, worsening pain, inability to walk, swelling in leg, weakness in leg, trouble urinating/defecating, and/or any concerning symptoms.

## 2024-05-10 NOTE — ED PROVIDER NOTE - CONDITION AT DISCHARGE:
Satisfactory Bilateral Helical Rim Advancement Flap Text: The defect edges were debeveled with a #15 blade scalpel.  Given the location of the defect and the proximity to free margins (helical rim) a bilateral helical rim advancement flap was deemed most appropriate.  Using a sterile surgical marker, the appropriate advancement flaps were drawn incorporating the defect and placing the expected incisions between the helical rim and antihelix where possible.  The area thus outlined was incised through and through with a #15 scalpel blade.  With a skin hook and iris scissors, the flaps were gently and sharply undermined and freed up.

## 2024-05-10 NOTE — ED ADULT TRIAGE NOTE - CHIEF COMPLAINT QUOTE
Pt co tingling to L anterior thigh and sensitive to touch. Pain worsens based on leg position. Denies inability to ambulate, injury or trauma, bladder/ bowel incontinence.

## 2024-05-14 DIAGNOSIS — R10.32 LEFT LOWER QUADRANT PAIN: ICD-10-CM

## 2024-05-14 DIAGNOSIS — M54.50 LOW BACK PAIN, UNSPECIFIED: ICD-10-CM

## 2024-05-14 DIAGNOSIS — M79.652 PAIN IN LEFT THIGH: ICD-10-CM

## 2024-05-14 DIAGNOSIS — M79.605 PAIN IN LEFT LEG: ICD-10-CM

## 2025-02-28 ENCOUNTER — NON-APPOINTMENT (OUTPATIENT)
Age: 40
End: 2025-02-28

## 2025-03-05 ENCOUNTER — APPOINTMENT (OUTPATIENT)
Dept: INTERNAL MEDICINE | Facility: CLINIC | Age: 40
End: 2025-03-05
Payer: COMMERCIAL

## 2025-03-05 ENCOUNTER — NON-APPOINTMENT (OUTPATIENT)
Age: 40
End: 2025-03-05

## 2025-03-05 VITALS
BODY MASS INDEX: 26.75 KG/M2 | OXYGEN SATURATION: 99 % | SYSTOLIC BLOOD PRESSURE: 122 MMHG | HEART RATE: 78 BPM | WEIGHT: 151 LBS | DIASTOLIC BLOOD PRESSURE: 84 MMHG

## 2025-03-05 DIAGNOSIS — Z82.3 FAMILY HISTORY OF STROKE: ICD-10-CM

## 2025-03-05 DIAGNOSIS — Z13.220 ENCOUNTER FOR SCREENING FOR LIPOID DISORDERS: ICD-10-CM

## 2025-03-05 DIAGNOSIS — Z13.1 ENCOUNTER FOR SCREENING FOR DIABETES MELLITUS: ICD-10-CM

## 2025-03-05 PROCEDURE — 99385 PREV VISIT NEW AGE 18-39: CPT

## 2025-03-05 PROCEDURE — 36415 COLL VENOUS BLD VENIPUNCTURE: CPT

## 2025-03-06 ENCOUNTER — TRANSCRIPTION ENCOUNTER (OUTPATIENT)
Age: 40
End: 2025-03-06

## 2025-03-06 LAB
ALBUMIN SERPL ELPH-MCNC: 4.5 G/DL
ALP BLD-CCNC: 58 U/L
ALT SERPL-CCNC: 13 U/L
ANION GAP SERPL CALC-SCNC: 12 MMOL/L
AST SERPL-CCNC: 14 U/L
BASOPHILS # BLD AUTO: 0.03 K/UL
BASOPHILS NFR BLD AUTO: 0.5 %
BILIRUB SERPL-MCNC: 0.6 MG/DL
BUN SERPL-MCNC: 11 MG/DL
CALCIUM SERPL-MCNC: 9.2 MG/DL
CHLORIDE SERPL-SCNC: 101 MMOL/L
CHOLEST SERPL-MCNC: 192 MG/DL
CO2 SERPL-SCNC: 25 MMOL/L
CREAT SERPL-MCNC: 0.82 MG/DL
EGFRCR SERPLBLD CKD-EPI 2021: 93 ML/MIN/1.73M2
EOSINOPHIL # BLD AUTO: 0.12 K/UL
EOSINOPHIL NFR BLD AUTO: 1.8 %
ESTIMATED AVERAGE GLUCOSE: 111 MG/DL
GLUCOSE SERPL-MCNC: 99 MG/DL
HBA1C MFR BLD HPLC: 5.5 %
HCT VFR BLD CALC: 43 %
HDLC SERPL-MCNC: 58 MG/DL
HGB BLD-MCNC: 14.4 G/DL
IMM GRANULOCYTES NFR BLD AUTO: 0.2 %
LDLC SERPL CALC-MCNC: 116 MG/DL
LYMPHOCYTES # BLD AUTO: 2.03 K/UL
LYMPHOCYTES NFR BLD AUTO: 30.7 %
MAN DIFF?: NORMAL
MCHC RBC-ENTMCNC: 31.4 PG
MCHC RBC-ENTMCNC: 33.5 G/DL
MCV RBC AUTO: 93.9 FL
MONOCYTES # BLD AUTO: 0.53 K/UL
MONOCYTES NFR BLD AUTO: 8 %
NEUTROPHILS # BLD AUTO: 3.89 K/UL
NEUTROPHILS NFR BLD AUTO: 58.8 %
NONHDLC SERPL-MCNC: 134 MG/DL
PLATELET # BLD AUTO: 286 K/UL
POTASSIUM SERPL-SCNC: 4.3 MMOL/L
PROT SERPL-MCNC: 7.3 G/DL
RBC # BLD: 4.58 M/UL
RBC # FLD: 12.4 %
SODIUM SERPL-SCNC: 138 MMOL/L
TRIGL SERPL-MCNC: 98 MG/DL
TSH SERPL-ACNC: 1.28 UIU/ML
WBC # FLD AUTO: 6.61 K/UL